# Patient Record
Sex: FEMALE | Race: WHITE | NOT HISPANIC OR LATINO | Employment: FULL TIME | URBAN - METROPOLITAN AREA
[De-identification: names, ages, dates, MRNs, and addresses within clinical notes are randomized per-mention and may not be internally consistent; named-entity substitution may affect disease eponyms.]

---

## 2017-11-20 ENCOUNTER — HOSPITAL ENCOUNTER (OUTPATIENT)
Dept: FAMILY MEDICINE CLINIC | Facility: CLINIC | Age: 37
Setting detail: SPECIMEN
Discharge: HOME | End: 2017-11-20
Attending: FAMILY MEDICINE | Admitting: FAMILY MEDICINE

## 2017-11-20 LAB
ALBUMIN SERPL-MCNC: 4 G/DL (ref 3.5–4.8)
ALBUMIN/GLOB SERPL: 1.3 {RATIO} (ref 1–1.7)
ALP SERPL-CCNC: 73 IU/L (ref 32–91)
ALT SERPL-CCNC: 18 IU/L (ref 14–54)
ANION GAP SERPL CALC-SCNC: 9.6 MMOL/L (ref 10–20)
AST SERPL-CCNC: 20 IU/L (ref 15–41)
BILIRUB SERPL-MCNC: 0.6 MG/DL (ref 0.3–1.2)
BUN SERPL-MCNC: 8 MG/DL (ref 8–20)
BUN/CREAT SERPL: 11.4 (ref 5.4–26.2)
CALCIUM SERPL-MCNC: 9.3 MG/DL (ref 8.9–10.3)
CHLORIDE SERPL-SCNC: 103 MMOL/L (ref 101–111)
CHOLEST SERPL-MCNC: 235 MG/DL
CHOLEST/HDLC SERPL: 4.1 {RATIO}
CONV CO2: 27 MMOL/L (ref 22–32)
CONV LDL CHOLESTEROL DIRECT: 158 MG/DL (ref 0–100)
CONV TOTAL PROTEIN: 7.1 G/DL (ref 6.1–7.9)
CREAT UR-MCNC: 0.7 MG/DL (ref 0.4–1)
GLOBULIN UR ELPH-MCNC: 3.1 G/DL (ref 2.5–3.8)
GLUCOSE SERPL-MCNC: 92 MG/DL (ref 65–99)
HDLC SERPL-MCNC: 58 MG/DL
LDLC/HDLC SERPL: 2.7 {RATIO}
LIPID INTERPRETATION: ABNORMAL
POTASSIUM SERPL-SCNC: 3.6 MMOL/L (ref 3.6–5.1)
SODIUM SERPL-SCNC: 136 MMOL/L (ref 136–144)
TRIGL SERPL-MCNC: 194 MG/DL
VLDLC SERPL CALC-MCNC: 19.4 MG/DL

## 2020-11-13 ENCOUNTER — OFFICE VISIT (OUTPATIENT)
Dept: FAMILY MEDICINE CLINIC | Facility: CLINIC | Age: 40
End: 2020-11-13

## 2020-11-13 DIAGNOSIS — Z01.84 IMMUNITY STATUS TESTING: ICD-10-CM

## 2020-11-13 DIAGNOSIS — I10 ESSENTIAL HYPERTENSION: Primary | ICD-10-CM

## 2020-11-13 DIAGNOSIS — Z11.59 NEED FOR HEPATITIS C SCREENING TEST: ICD-10-CM

## 2020-11-13 DIAGNOSIS — E78.2 MIXED HYPERLIPIDEMIA: ICD-10-CM

## 2020-11-13 PROBLEM — G43.909 HEADACHE, MIGRAINE: Status: ACTIVE | Noted: 2020-11-13

## 2020-11-13 PROBLEM — F41.9 ANXIETY DISORDER: Status: ACTIVE | Noted: 2020-11-13

## 2020-11-13 PROBLEM — Z00.00 ENCOUNTER FOR GENERAL ADULT MEDICAL EXAMINATION WITHOUT ABNORMAL FINDINGS: Status: ACTIVE | Noted: 2017-11-20

## 2020-11-13 PROCEDURE — 99213 OFFICE O/P EST LOW 20 MIN: CPT | Performed by: FAMILY MEDICINE

## 2020-11-13 RX ORDER — ESCITALOPRAM OXALATE 20 MG/1
20 TABLET ORAL DAILY
Qty: 90 TABLET | Refills: 3 | Status: SHIPPED | OUTPATIENT
Start: 2020-11-13 | End: 2021-12-09

## 2020-11-13 RX ORDER — ESCITALOPRAM OXALATE 20 MG/1
20 TABLET ORAL DAILY
COMMUNITY
Start: 2015-07-08 | End: 2020-11-13 | Stop reason: SDUPTHER

## 2020-11-13 RX ORDER — METOPROLOL SUCCINATE 50 MG/1
75 TABLET, EXTENDED RELEASE ORAL DAILY
Qty: 135 TABLET | Refills: 3 | Status: SHIPPED | OUTPATIENT
Start: 2020-11-13 | End: 2021-12-09

## 2020-11-13 RX ORDER — METOPROLOL SUCCINATE 50 MG/1
75 TABLET, EXTENDED RELEASE ORAL DAILY
COMMUNITY
Start: 2015-07-08 | End: 2020-11-13 | Stop reason: SDUPTHER

## 2020-11-15 VITALS
HEIGHT: 66 IN | OXYGEN SATURATION: 100 % | WEIGHT: 214 LBS | DIASTOLIC BLOOD PRESSURE: 145 MMHG | TEMPERATURE: 97.8 F | HEART RATE: 108 BPM | SYSTOLIC BLOOD PRESSURE: 187 MMHG | BODY MASS INDEX: 34.39 KG/M2

## 2020-11-23 ENCOUNTER — TELEPHONE (OUTPATIENT)
Dept: FAMILY MEDICINE CLINIC | Facility: CLINIC | Age: 40
End: 2020-11-23

## 2020-11-23 RX ORDER — LISINOPRIL 20 MG/1
20 TABLET ORAL DAILY
Qty: 90 TABLET | Refills: 3 | Status: SHIPPED | OUTPATIENT
Start: 2020-11-23 | End: 2021-12-09

## 2020-12-11 ENCOUNTER — LAB (OUTPATIENT)
Dept: FAMILY MEDICINE CLINIC | Facility: CLINIC | Age: 40
End: 2020-12-11

## 2020-12-11 ENCOUNTER — OFFICE VISIT (OUTPATIENT)
Dept: FAMILY MEDICINE CLINIC | Facility: CLINIC | Age: 40
End: 2020-12-11

## 2020-12-11 VITALS
BODY MASS INDEX: 35.36 KG/M2 | DIASTOLIC BLOOD PRESSURE: 110 MMHG | SYSTOLIC BLOOD PRESSURE: 161 MMHG | WEIGHT: 220 LBS | HEIGHT: 66 IN | OXYGEN SATURATION: 99 % | TEMPERATURE: 98.4 F | HEART RATE: 69 BPM

## 2020-12-11 DIAGNOSIS — Z11.59 NEED FOR HEPATITIS C SCREENING TEST: ICD-10-CM

## 2020-12-11 DIAGNOSIS — I10 ESSENTIAL HYPERTENSION: Primary | ICD-10-CM

## 2020-12-11 DIAGNOSIS — Z01.84 IMMUNITY STATUS TESTING: ICD-10-CM

## 2020-12-11 DIAGNOSIS — E78.2 MIXED HYPERLIPIDEMIA: ICD-10-CM

## 2020-12-11 DIAGNOSIS — I10 ESSENTIAL HYPERTENSION: ICD-10-CM

## 2020-12-11 LAB
ALBUMIN SERPL-MCNC: 4.1 G/DL (ref 3.5–5.2)
ALBUMIN/GLOB SERPL: 1.3 G/DL
ALP SERPL-CCNC: 83 U/L (ref 39–117)
ALT SERPL W P-5'-P-CCNC: 21 U/L (ref 1–33)
ANION GAP SERPL CALCULATED.3IONS-SCNC: 7 MMOL/L (ref 5–15)
AST SERPL-CCNC: 19 U/L (ref 1–32)
BILIRUB SERPL-MCNC: 0.2 MG/DL (ref 0–1.2)
BUN SERPL-MCNC: 9 MG/DL (ref 6–20)
BUN/CREAT SERPL: 11.3 (ref 7–25)
CALCIUM SPEC-SCNC: 9.4 MG/DL (ref 8.6–10.5)
CHLORIDE SERPL-SCNC: 100 MMOL/L (ref 98–107)
CHOLEST SERPL-MCNC: 235 MG/DL (ref 0–200)
CO2 SERPL-SCNC: 30 MMOL/L (ref 22–29)
CREAT SERPL-MCNC: 0.8 MG/DL (ref 0.57–1)
GFR SERPL CREATININE-BSD FRML MDRD: 80 ML/MIN/1.73
GLOBULIN UR ELPH-MCNC: 3.1 GM/DL
GLUCOSE SERPL-MCNC: 90 MG/DL (ref 65–99)
HCV AB SER DONR QL: NORMAL
HDLC SERPL-MCNC: 67 MG/DL (ref 40–60)
LDLC SERPL CALC-MCNC: 129 MG/DL (ref 0–100)
LDLC/HDLC SERPL: 1.85 {RATIO}
POTASSIUM SERPL-SCNC: 4.3 MMOL/L (ref 3.5–5.2)
PROT SERPL-MCNC: 7.2 G/DL (ref 6–8.5)
SODIUM SERPL-SCNC: 137 MMOL/L (ref 136–145)
TRIGL SERPL-MCNC: 220 MG/DL (ref 0–150)
VLDLC SERPL-MCNC: 39 MG/DL (ref 5–40)

## 2020-12-11 PROCEDURE — 86769 SARS-COV-2 COVID-19 ANTIBODY: CPT | Performed by: FAMILY MEDICINE

## 2020-12-11 PROCEDURE — 36415 COLL VENOUS BLD VENIPUNCTURE: CPT | Performed by: FAMILY MEDICINE

## 2020-12-11 PROCEDURE — 80053 COMPREHEN METABOLIC PANEL: CPT | Performed by: FAMILY MEDICINE

## 2020-12-11 PROCEDURE — 99213 OFFICE O/P EST LOW 20 MIN: CPT | Performed by: FAMILY MEDICINE

## 2020-12-11 PROCEDURE — 80061 LIPID PANEL: CPT | Performed by: FAMILY MEDICINE

## 2020-12-11 PROCEDURE — 86803 HEPATITIS C AB TEST: CPT | Performed by: FAMILY MEDICINE

## 2020-12-12 LAB — SARS-COV-2 AB SERPL QL IA: NEGATIVE

## 2021-12-09 RX ORDER — LISINOPRIL 20 MG/1
TABLET ORAL
Qty: 30 TABLET | Refills: 0 | Status: SHIPPED | OUTPATIENT
Start: 2021-12-09 | End: 2022-05-05

## 2021-12-09 RX ORDER — METOPROLOL SUCCINATE 50 MG/1
TABLET, EXTENDED RELEASE ORAL
Qty: 45 TABLET | Refills: 0 | Status: SHIPPED | OUTPATIENT
Start: 2021-12-09 | End: 2022-05-05

## 2021-12-09 RX ORDER — ESCITALOPRAM OXALATE 20 MG/1
TABLET ORAL
Qty: 30 TABLET | Refills: 0 | Status: SHIPPED | OUTPATIENT
Start: 2021-12-09 | End: 2022-05-05

## 2022-05-05 RX ORDER — ESCITALOPRAM OXALATE 20 MG/1
TABLET ORAL
Qty: 30 TABLET | Refills: 0 | Status: SHIPPED | OUTPATIENT
Start: 2022-05-05 | End: 2022-05-06

## 2022-05-05 RX ORDER — METOPROLOL SUCCINATE 50 MG/1
TABLET, EXTENDED RELEASE ORAL
Qty: 45 TABLET | Refills: 0 | Status: SHIPPED | OUTPATIENT
Start: 2022-05-05 | End: 2022-05-06

## 2022-05-05 RX ORDER — LISINOPRIL 20 MG/1
TABLET ORAL
Qty: 30 TABLET | Refills: 0 | Status: SHIPPED | OUTPATIENT
Start: 2022-05-05 | End: 2022-05-06

## 2022-05-06 RX ORDER — LISINOPRIL 20 MG/1
TABLET ORAL
Qty: 30 TABLET | Refills: 0 | Status: SHIPPED | OUTPATIENT
Start: 2022-05-06 | End: 2022-06-01

## 2022-05-06 RX ORDER — ESCITALOPRAM OXALATE 20 MG/1
TABLET ORAL
Qty: 30 TABLET | Refills: 0 | Status: SHIPPED | OUTPATIENT
Start: 2022-05-06 | End: 2022-06-01

## 2022-05-06 RX ORDER — METOPROLOL SUCCINATE 50 MG/1
TABLET, EXTENDED RELEASE ORAL
Qty: 45 TABLET | Refills: 0 | Status: SHIPPED | OUTPATIENT
Start: 2022-05-06 | End: 2022-06-01

## 2022-06-01 RX ORDER — METOPROLOL SUCCINATE 50 MG/1
TABLET, EXTENDED RELEASE ORAL
Qty: 45 TABLET | Refills: 0 | Status: SHIPPED | OUTPATIENT
Start: 2022-06-01 | End: 2023-03-21 | Stop reason: SDUPTHER

## 2022-06-01 RX ORDER — LISINOPRIL 20 MG/1
TABLET ORAL
Qty: 30 TABLET | Refills: 0 | Status: SHIPPED | OUTPATIENT
Start: 2022-06-01 | End: 2023-03-21 | Stop reason: SDUPTHER

## 2022-06-01 RX ORDER — ESCITALOPRAM OXALATE 20 MG/1
TABLET ORAL
Qty: 30 TABLET | Refills: 0 | Status: SHIPPED | OUTPATIENT
Start: 2022-06-01 | End: 2023-03-21 | Stop reason: SDUPTHER

## 2022-06-10 RX ORDER — ESCITALOPRAM OXALATE 20 MG/1
20 TABLET ORAL DAILY
Qty: 30 TABLET | Refills: 0 | OUTPATIENT
Start: 2022-06-10

## 2022-06-30 RX ORDER — ESCITALOPRAM OXALATE 20 MG/1
TABLET ORAL
Qty: 30 TABLET | Refills: 0 | OUTPATIENT
Start: 2022-06-30

## 2022-06-30 RX ORDER — LISINOPRIL 20 MG/1
TABLET ORAL
Qty: 30 TABLET | Refills: 0 | OUTPATIENT
Start: 2022-06-30

## 2022-06-30 RX ORDER — METOPROLOL SUCCINATE 50 MG/1
TABLET, EXTENDED RELEASE ORAL
Qty: 45 TABLET | Refills: 0 | OUTPATIENT
Start: 2022-06-30

## 2022-08-01 RX ORDER — ESCITALOPRAM OXALATE 20 MG/1
TABLET ORAL
Qty: 30 TABLET | Refills: 0 | OUTPATIENT
Start: 2022-08-01

## 2022-08-01 RX ORDER — LISINOPRIL 20 MG/1
TABLET ORAL
Qty: 30 TABLET | Refills: 0 | OUTPATIENT
Start: 2022-08-01

## 2022-08-01 RX ORDER — METOPROLOL SUCCINATE 50 MG/1
TABLET, EXTENDED RELEASE ORAL
Qty: 45 TABLET | Refills: 0 | OUTPATIENT
Start: 2022-08-01

## 2023-03-21 ENCOUNTER — LAB (OUTPATIENT)
Dept: FAMILY MEDICINE CLINIC | Facility: CLINIC | Age: 43
End: 2023-03-21
Payer: COMMERCIAL

## 2023-03-21 ENCOUNTER — OFFICE VISIT (OUTPATIENT)
Dept: FAMILY MEDICINE CLINIC | Facility: CLINIC | Age: 43
End: 2023-03-21
Payer: COMMERCIAL

## 2023-03-21 VITALS
BODY MASS INDEX: 31.55 KG/M2 | DIASTOLIC BLOOD PRESSURE: 107 MMHG | HEIGHT: 67 IN | OXYGEN SATURATION: 99 % | SYSTOLIC BLOOD PRESSURE: 180 MMHG | WEIGHT: 201 LBS | HEART RATE: 93 BPM | TEMPERATURE: 97.7 F

## 2023-03-21 DIAGNOSIS — I10 PRIMARY HYPERTENSION: ICD-10-CM

## 2023-03-21 DIAGNOSIS — E78.2 MIXED HYPERLIPIDEMIA: Primary | ICD-10-CM

## 2023-03-21 DIAGNOSIS — E78.2 MIXED HYPERLIPIDEMIA: ICD-10-CM

## 2023-03-21 DIAGNOSIS — M25.531 RIGHT WRIST PAIN: ICD-10-CM

## 2023-03-21 LAB
ALBUMIN SERPL-MCNC: 4.3 G/DL (ref 3.5–5.2)
ALBUMIN/GLOB SERPL: 1.4 G/DL
ALP SERPL-CCNC: 107 U/L (ref 39–117)
ALT SERPL W P-5'-P-CCNC: 16 U/L (ref 1–33)
ANION GAP SERPL CALCULATED.3IONS-SCNC: 13.3 MMOL/L (ref 5–15)
AST SERPL-CCNC: 15 U/L (ref 1–32)
BILIRUB SERPL-MCNC: 0.2 MG/DL (ref 0–1.2)
BUN SERPL-MCNC: 12 MG/DL (ref 6–20)
BUN/CREAT SERPL: 16.7 (ref 7–25)
CALCIUM SPEC-SCNC: 9.6 MG/DL (ref 8.6–10.5)
CHLORIDE SERPL-SCNC: 102 MMOL/L (ref 98–107)
CHOLEST SERPL-MCNC: 262 MG/DL (ref 0–200)
CO2 SERPL-SCNC: 24.7 MMOL/L (ref 22–29)
CREAT SERPL-MCNC: 0.72 MG/DL (ref 0.57–1)
EGFRCR SERPLBLD CKD-EPI 2021: 107.9 ML/MIN/1.73
GLOBULIN UR ELPH-MCNC: 3 GM/DL
GLUCOSE SERPL-MCNC: 93 MG/DL (ref 65–99)
HDLC SERPL-MCNC: 60 MG/DL (ref 40–60)
LDLC SERPL CALC-MCNC: 145 MG/DL (ref 0–100)
LDLC/HDLC SERPL: 2.32 {RATIO}
POTASSIUM SERPL-SCNC: 4 MMOL/L (ref 3.5–5.2)
PROT SERPL-MCNC: 7.3 G/DL (ref 6–8.5)
SODIUM SERPL-SCNC: 140 MMOL/L (ref 136–145)
TRIGL SERPL-MCNC: 314 MG/DL (ref 0–150)
VLDLC SERPL-MCNC: 57 MG/DL (ref 5–40)

## 2023-03-21 PROCEDURE — 80061 LIPID PANEL: CPT | Performed by: FAMILY MEDICINE

## 2023-03-21 PROCEDURE — 36415 COLL VENOUS BLD VENIPUNCTURE: CPT

## 2023-03-21 PROCEDURE — 80053 COMPREHEN METABOLIC PANEL: CPT | Performed by: FAMILY MEDICINE

## 2023-03-21 PROCEDURE — 99213 OFFICE O/P EST LOW 20 MIN: CPT | Performed by: FAMILY MEDICINE

## 2023-03-21 RX ORDER — ESCITALOPRAM OXALATE 20 MG/1
20 TABLET ORAL DAILY
Qty: 90 TABLET | Refills: 3 | Status: SHIPPED | OUTPATIENT
Start: 2023-03-21

## 2023-03-21 RX ORDER — LISINOPRIL 20 MG/1
20 TABLET ORAL DAILY
Qty: 90 TABLET | Refills: 3 | Status: SHIPPED | OUTPATIENT
Start: 2023-03-21

## 2023-03-21 RX ORDER — CETIRIZINE HYDROCHLORIDE 10 MG/1
10 TABLET ORAL DAILY
COMMUNITY

## 2023-03-21 RX ORDER — METOPROLOL SUCCINATE 50 MG/1
75 TABLET, EXTENDED RELEASE ORAL DAILY
Qty: 135 TABLET | Refills: 3 | Status: SHIPPED | OUTPATIENT
Start: 2023-03-21

## 2024-06-15 ENCOUNTER — APPOINTMENT (OUTPATIENT)
Dept: GENERAL RADIOLOGY | Facility: HOSPITAL | Age: 44
End: 2024-06-15
Payer: COMMERCIAL

## 2024-06-15 ENCOUNTER — HOSPITAL ENCOUNTER (EMERGENCY)
Facility: HOSPITAL | Age: 44
Discharge: HOME | End: 2024-06-15
Payer: COMMERCIAL

## 2024-06-15 VITALS
DIASTOLIC BLOOD PRESSURE: 116 MMHG | OXYGEN SATURATION: 94 % | BODY MASS INDEX: 37.52 KG/M2 | HEART RATE: 77 BPM | RESPIRATION RATE: 18 BRPM | WEIGHT: 233.47 LBS | HEIGHT: 66 IN | SYSTOLIC BLOOD PRESSURE: 167 MMHG | TEMPERATURE: 98.2 F

## 2024-06-15 DIAGNOSIS — M54.9 MUSCULOSKELETAL BACK PAIN: Primary | ICD-10-CM

## 2024-06-15 PROCEDURE — 96372 THER/PROPH/DIAG INJ SC/IM: CPT

## 2024-06-15 PROCEDURE — 25010000002 DEXAMETHASONE SODIUM PHOSPHATE 10 MG/ML SOLUTION: Performed by: NURSE PRACTITIONER

## 2024-06-15 PROCEDURE — 99283 EMERGENCY DEPT VISIT LOW MDM: CPT

## 2024-06-15 PROCEDURE — 25010000002 KETOROLAC TROMETHAMINE PER 15 MG: Performed by: NURSE PRACTITIONER

## 2024-06-15 PROCEDURE — 63710000001 ONDANSETRON ODT 4 MG TABLET DISPERSIBLE: Performed by: NURSE PRACTITIONER

## 2024-06-15 PROCEDURE — 73010 X-RAY EXAM OF SHOULDER BLADE: CPT

## 2024-06-15 RX ORDER — KETOROLAC TROMETHAMINE 30 MG/ML
60 INJECTION, SOLUTION INTRAMUSCULAR; INTRAVENOUS ONCE
Status: COMPLETED | OUTPATIENT
Start: 2024-06-15 | End: 2024-06-15

## 2024-06-15 RX ORDER — METHYLPREDNISOLONE 4 MG/1
TABLET ORAL
Qty: 21 TABLET | Refills: 0 | Status: SHIPPED | OUTPATIENT
Start: 2024-06-15

## 2024-06-15 RX ORDER — DEXAMETHASONE SODIUM PHOSPHATE 10 MG/ML
10 INJECTION, SOLUTION INTRAMUSCULAR; INTRAVENOUS ONCE
Status: COMPLETED | OUTPATIENT
Start: 2024-06-15 | End: 2024-06-15

## 2024-06-15 RX ORDER — ACETAMINOPHEN 500 MG
1000 TABLET ORAL ONCE
Status: COMPLETED | OUTPATIENT
Start: 2024-06-15 | End: 2024-06-15

## 2024-06-15 RX ORDER — LIDOCAINE 50 MG/G
1 PATCH TOPICAL EVERY 24 HOURS
Qty: 5 PATCH | Refills: 0 | Status: SHIPPED | OUTPATIENT
Start: 2024-06-15 | End: 2024-06-20

## 2024-06-15 RX ORDER — METOPROLOL SUCCINATE 50 MG/1
100 TABLET, EXTENDED RELEASE ORAL ONCE
Status: COMPLETED | OUTPATIENT
Start: 2024-06-15 | End: 2024-06-15

## 2024-06-15 RX ORDER — CYCLOBENZAPRINE HCL 10 MG
10 TABLET ORAL ONCE
Status: COMPLETED | OUTPATIENT
Start: 2024-06-15 | End: 2024-06-15

## 2024-06-15 RX ORDER — LIDOCAINE 4 G/G
1 PATCH TOPICAL
Status: DISCONTINUED | OUTPATIENT
Start: 2024-06-15 | End: 2024-06-15 | Stop reason: HOSPADM

## 2024-06-15 RX ORDER — ONDANSETRON 4 MG/1
4 TABLET, ORALLY DISINTEGRATING ORAL ONCE
Status: COMPLETED | OUTPATIENT
Start: 2024-06-15 | End: 2024-06-15

## 2024-06-15 RX ORDER — CYCLOBENZAPRINE HCL 10 MG
10 TABLET ORAL 3 TIMES DAILY PRN
Qty: 9 TABLET | Refills: 0 | Status: SHIPPED | OUTPATIENT
Start: 2024-06-15 | End: 2024-06-18

## 2024-06-15 RX ADMIN — KETOROLAC TROMETHAMINE 60 MG: 30 INJECTION, SOLUTION INTRAMUSCULAR at 15:34

## 2024-06-15 RX ADMIN — DEXAMETHASONE SODIUM PHOSPHATE 10 MG: 10 INJECTION, SOLUTION INTRAMUSCULAR; INTRAVENOUS at 15:35

## 2024-06-15 RX ADMIN — METOPROLOL SUCCINATE 100 MG: 50 TABLET, EXTENDED RELEASE ORAL at 15:33

## 2024-06-15 RX ADMIN — LISINOPRIL 30 MG: 20 TABLET ORAL at 15:33

## 2024-06-15 RX ADMIN — CYCLOBENZAPRINE 10 MG: 10 TABLET, FILM COATED ORAL at 15:33

## 2024-06-15 RX ADMIN — ACETAMINOPHEN 1000 MG: 500 TABLET, FILM COATED ORAL at 14:18

## 2024-06-15 RX ADMIN — ONDANSETRON 4 MG: 4 TABLET, ORALLY DISINTEGRATING ORAL at 15:33

## 2024-06-15 RX ADMIN — LIDOCAINE 1 PATCH: 4 PATCH TOPICAL at 14:18

## 2024-07-02 ENCOUNTER — TRANSCRIBE ORDERS (OUTPATIENT)
Dept: PHYSICAL THERAPY | Facility: CLINIC | Age: 44
End: 2024-07-02
Payer: COMMERCIAL

## 2024-07-02 DIAGNOSIS — M54.12 RADICULOPATHY, CERVICAL REGION: ICD-10-CM

## 2024-07-02 DIAGNOSIS — S46.812D STRAIN OF OTHER MUSCLES, FASCIA AND TENDONS AT SHOULDER AND UPPER ARM LEVEL, LEFT ARM, SUBSEQUENT ENCOUNTER: Primary | ICD-10-CM
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
## 2024-07-02 DIAGNOSIS — S23.3XXD SPRAIN OF LIGAMENTS OF THORACIC SPINE, SUBSEQUENT ENCOUNTER: ICD-10-CM
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
## 2024-07-03 ENCOUNTER — TREATMENT (OUTPATIENT)
Dept: PHYSICAL THERAPY | Facility: CLINIC | Age: 44
End: 2024-07-03
Payer: COMMERCIAL
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
## 2024-07-03 DIAGNOSIS — S23.3XXD SPRAIN OF LIGAMENTS OF THORACIC SPINE, SUBSEQUENT ENCOUNTER: ICD-10-CM

## 2024-07-03 DIAGNOSIS — S46.812D STRAIN OF OTHER MUSCLES, FASCIA AND TENDONS AT SHOULDER AND UPPER ARM LEVEL, LEFT ARM, SUBSEQUENT ENCOUNTER: Primary | ICD-10-CM
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
## 2024-07-03 DIAGNOSIS — M54.12 RADICULOPATHY, CERVICAL REGION: ICD-10-CM

## 2024-07-05 ENCOUNTER — TREATMENT (OUTPATIENT)
Dept: PHYSICAL THERAPY | Facility: CLINIC | Age: 44
End: 2024-07-05
Payer: COMMERCIAL
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
## 2024-07-05 DIAGNOSIS — M54.12 RADICULOPATHY, CERVICAL REGION: ICD-10-CM

## 2024-07-05 DIAGNOSIS — S46.812D STRAIN OF OTHER MUSCLES, FASCIA AND TENDONS AT SHOULDER AND UPPER ARM LEVEL, LEFT ARM, SUBSEQUENT ENCOUNTER: Primary | ICD-10-CM
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
## 2024-07-05 DIAGNOSIS — S23.3XXD SPRAIN OF LIGAMENTS OF THORACIC SPINE, SUBSEQUENT ENCOUNTER: ICD-10-CM

## 2024-07-08 ENCOUNTER — TREATMENT (OUTPATIENT)
Dept: PHYSICAL THERAPY | Facility: CLINIC | Age: 44
End: 2024-07-08
Payer: COMMERCIAL
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
## 2024-07-08 DIAGNOSIS — M54.12 RADICULOPATHY, CERVICAL REGION: ICD-10-CM
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
## 2024-07-08 DIAGNOSIS — S46.812D STRAIN OF OTHER MUSCLES, FASCIA AND TENDONS AT SHOULDER AND UPPER ARM LEVEL, LEFT ARM, SUBSEQUENT ENCOUNTER: Primary | ICD-10-CM

## 2024-07-08 DIAGNOSIS — S23.3XXD SPRAIN OF LIGAMENTS OF THORACIC SPINE, SUBSEQUENT ENCOUNTER: ICD-10-CM

## 2024-07-12 ENCOUNTER — TREATMENT (OUTPATIENT)
Dept: PHYSICAL THERAPY | Facility: CLINIC | Age: 44
End: 2024-07-12
Payer: COMMERCIAL

## 2024-07-12 DIAGNOSIS — M54.12 RADICULOPATHY, CERVICAL REGION: ICD-10-CM
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
## 2024-07-12 DIAGNOSIS — S46.812D STRAIN OF OTHER MUSCLES, FASCIA AND TENDONS AT SHOULDER AND UPPER ARM LEVEL, LEFT ARM, SUBSEQUENT ENCOUNTER: Primary | ICD-10-CM

## 2024-07-12 DIAGNOSIS — S23.3XXD SPRAIN OF LIGAMENTS OF THORACIC SPINE, SUBSEQUENT ENCOUNTER: ICD-10-CM

## 2024-07-18 ENCOUNTER — TREATMENT (OUTPATIENT)
Dept: PHYSICAL THERAPY | Facility: CLINIC | Age: 44
End: 2024-07-18
Payer: COMMERCIAL

## 2024-07-18 DIAGNOSIS — M54.12 RADICULOPATHY, CERVICAL REGION: ICD-10-CM

## 2024-07-18 DIAGNOSIS — S46.812D STRAIN OF OTHER MUSCLES, FASCIA AND TENDONS AT SHOULDER AND UPPER ARM LEVEL, LEFT ARM, SUBSEQUENT ENCOUNTER: Primary | ICD-10-CM
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
## 2024-07-18 DIAGNOSIS — S23.3XXD SPRAIN OF LIGAMENTS OF THORACIC SPINE, SUBSEQUENT ENCOUNTER: ICD-10-CM
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
## 2024-07-19 ENCOUNTER — TREATMENT (OUTPATIENT)
Dept: PHYSICAL THERAPY | Facility: CLINIC | Age: 44
End: 2024-07-19
Payer: COMMERCIAL
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
## 2024-07-19 DIAGNOSIS — S23.3XXD SPRAIN OF LIGAMENTS OF THORACIC SPINE, SUBSEQUENT ENCOUNTER: ICD-10-CM

## 2024-07-19 DIAGNOSIS — S46.812D STRAIN OF OTHER MUSCLES, FASCIA AND TENDONS AT SHOULDER AND UPPER ARM LEVEL, LEFT ARM, SUBSEQUENT ENCOUNTER: Primary | ICD-10-CM

## 2024-07-19 DIAGNOSIS — M54.12 RADICULOPATHY, CERVICAL REGION: ICD-10-CM

## 2024-07-22 ENCOUNTER — TREATMENT (OUTPATIENT)
Dept: PHYSICAL THERAPY | Facility: CLINIC | Age: 44
End: 2024-07-22
Payer: COMMERCIAL
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
## 2024-07-22 DIAGNOSIS — M54.12 RADICULOPATHY, CERVICAL REGION: ICD-10-CM

## 2024-07-22 DIAGNOSIS — S46.812D STRAIN OF OTHER MUSCLES, FASCIA AND TENDONS AT SHOULDER AND UPPER ARM LEVEL, LEFT ARM, SUBSEQUENT ENCOUNTER: Primary | ICD-10-CM

## 2024-07-22 DIAGNOSIS — S23.3XXD SPRAIN OF LIGAMENTS OF THORACIC SPINE, SUBSEQUENT ENCOUNTER: ICD-10-CM

## 2024-07-24 ENCOUNTER — TRANSCRIBE ORDERS (OUTPATIENT)
Dept: ADMINISTRATIVE | Facility: HOSPITAL | Age: 44
End: 2024-07-24
Payer: COMMERCIAL

## 2024-07-24 DIAGNOSIS — S46.812A STRAIN OF OTHER MUSCLES, FASCIA AND TENDONS AT SHOULDER AND UPPER ARM LEVEL, LEFT ARM, INITIAL ENCOUNTER: Primary | ICD-10-CM

## 2024-07-29 ENCOUNTER — TREATMENT (OUTPATIENT)
Dept: PHYSICAL THERAPY | Facility: CLINIC | Age: 44
End: 2024-07-29
Payer: COMMERCIAL
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
## 2024-07-29 DIAGNOSIS — M54.12 RADICULOPATHY, CERVICAL REGION: ICD-10-CM

## 2024-07-29 DIAGNOSIS — S23.3XXD SPRAIN OF LIGAMENTS OF THORACIC SPINE, SUBSEQUENT ENCOUNTER: ICD-10-CM

## 2024-07-29 DIAGNOSIS — S46.812D STRAIN OF OTHER MUSCLES, FASCIA AND TENDONS AT SHOULDER AND UPPER ARM LEVEL, LEFT ARM, SUBSEQUENT ENCOUNTER: Primary | ICD-10-CM

## 2024-07-29 PROCEDURE — 97140 MANUAL THERAPY 1/> REGIONS: CPT | Performed by: PHYSICAL THERAPIST

## 2024-07-29 PROCEDURE — 97110 THERAPEUTIC EXERCISES: CPT | Performed by: PHYSICAL THERAPIST

## 2024-07-29 PROCEDURE — 97012 MECHANICAL TRACTION THERAPY: CPT | Performed by: PHYSICAL THERAPIST

## 2024-07-31 ENCOUNTER — TREATMENT (OUTPATIENT)
Dept: PHYSICAL THERAPY | Facility: CLINIC | Age: 44
End: 2024-07-31
Payer: COMMERCIAL
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
## 2024-07-31 DIAGNOSIS — M54.12 RADICULOPATHY, CERVICAL REGION: ICD-10-CM

## 2024-07-31 DIAGNOSIS — S23.3XXD SPRAIN OF LIGAMENTS OF THORACIC SPINE, SUBSEQUENT ENCOUNTER: ICD-10-CM

## 2024-07-31 DIAGNOSIS — S46.812D STRAIN OF OTHER MUSCLES, FASCIA AND TENDONS AT SHOULDER AND UPPER ARM LEVEL, LEFT ARM, SUBSEQUENT ENCOUNTER: Primary | ICD-10-CM
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
## 2024-08-05 ENCOUNTER — TREATMENT (OUTPATIENT)
Dept: PHYSICAL THERAPY | Facility: CLINIC | Age: 44
End: 2024-08-05
Payer: COMMERCIAL

## 2024-08-05 DIAGNOSIS — S23.3XXD SPRAIN OF LIGAMENTS OF THORACIC SPINE, SUBSEQUENT ENCOUNTER: ICD-10-CM
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
## 2024-08-05 DIAGNOSIS — S46.812D STRAIN OF OTHER MUSCLES, FASCIA AND TENDONS AT SHOULDER AND UPPER ARM LEVEL, LEFT ARM, SUBSEQUENT ENCOUNTER: Primary | ICD-10-CM

## 2024-08-05 DIAGNOSIS — M54.12 RADICULOPATHY, CERVICAL REGION: ICD-10-CM
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
## 2024-08-05 PROCEDURE — 97140 MANUAL THERAPY 1/> REGIONS: CPT | Performed by: PHYSICAL THERAPIST

## 2024-08-05 PROCEDURE — 97110 THERAPEUTIC EXERCISES: CPT | Performed by: PHYSICAL THERAPIST

## 2024-08-05 PROCEDURE — 97530 THERAPEUTIC ACTIVITIES: CPT | Performed by: PHYSICAL THERAPIST

## 2024-08-05 PROCEDURE — 97012 MECHANICAL TRACTION THERAPY: CPT | Performed by: PHYSICAL THERAPIST

## 2024-08-09 ENCOUNTER — TREATMENT (OUTPATIENT)
Dept: PHYSICAL THERAPY | Facility: CLINIC | Age: 44
End: 2024-08-09
Payer: COMMERCIAL
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
## 2024-08-09 DIAGNOSIS — S23.3XXD SPRAIN OF LIGAMENTS OF THORACIC SPINE, SUBSEQUENT ENCOUNTER: ICD-10-CM
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
## 2024-08-09 DIAGNOSIS — M54.12 RADICULOPATHY, CERVICAL REGION: ICD-10-CM

## 2024-08-09 DIAGNOSIS — S46.812D STRAIN OF OTHER MUSCLES, FASCIA AND TENDONS AT SHOULDER AND UPPER ARM LEVEL, LEFT ARM, SUBSEQUENT ENCOUNTER: Primary | ICD-10-CM

## 2024-08-14 ENCOUNTER — TREATMENT (OUTPATIENT)
Dept: PHYSICAL THERAPY | Facility: CLINIC | Age: 44
End: 2024-08-14
Payer: COMMERCIAL
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
## 2024-08-14 DIAGNOSIS — S46.812D STRAIN OF OTHER MUSCLES, FASCIA AND TENDONS AT SHOULDER AND UPPER ARM LEVEL, LEFT ARM, SUBSEQUENT ENCOUNTER: Primary | ICD-10-CM

## 2024-08-14 DIAGNOSIS — M54.12 RADICULOPATHY, CERVICAL REGION: ICD-10-CM

## 2024-08-14 DIAGNOSIS — S23.3XXD SPRAIN OF LIGAMENTS OF THORACIC SPINE, SUBSEQUENT ENCOUNTER: ICD-10-CM

## 2024-08-15 ENCOUNTER — TRANSCRIBE ORDERS (OUTPATIENT)
Dept: PHYSICAL THERAPY | Facility: CLINIC | Age: 44
End: 2024-08-15
Payer: COMMERCIAL

## 2024-08-15 DIAGNOSIS — S16.1XXD STRAIN OF NECK MUSCLE, SUBSEQUENT ENCOUNTER: Primary | ICD-10-CM

## 2024-08-16 ENCOUNTER — TREATMENT (OUTPATIENT)
Dept: PHYSICAL THERAPY | Facility: CLINIC | Age: 44
End: 2024-08-16
Payer: COMMERCIAL
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
## 2024-08-16 DIAGNOSIS — S23.3XXD SPRAIN OF LIGAMENTS OF THORACIC SPINE, SUBSEQUENT ENCOUNTER: ICD-10-CM

## 2024-08-16 DIAGNOSIS — M54.12 RADICULOPATHY, CERVICAL REGION: ICD-10-CM

## 2024-08-16 DIAGNOSIS — S46.812D STRAIN OF OTHER MUSCLES, FASCIA AND TENDONS AT SHOULDER AND UPPER ARM LEVEL, LEFT ARM, SUBSEQUENT ENCOUNTER: Primary | ICD-10-CM

## 2024-08-28 ENCOUNTER — OFFICE VISIT (OUTPATIENT)
Dept: NEUROSURGERY | Facility: CLINIC | Age: 44
End: 2024-08-28
Payer: COMMERCIAL

## 2024-08-28 VITALS
OXYGEN SATURATION: 99 % | HEART RATE: 68 BPM | WEIGHT: 230 LBS | HEIGHT: 66 IN | SYSTOLIC BLOOD PRESSURE: 170 MMHG | BODY MASS INDEX: 36.96 KG/M2 | DIASTOLIC BLOOD PRESSURE: 118 MMHG

## 2024-08-28 DIAGNOSIS — M50.30 DDD (DEGENERATIVE DISC DISEASE), CERVICAL: Primary | ICD-10-CM

## 2024-08-28 DIAGNOSIS — M50.20 CERVICAL DISC HERNIATION: ICD-10-CM

## 2024-08-28 PROCEDURE — 99213 OFFICE O/P EST LOW 20 MIN: CPT | Performed by: NURSE PRACTITIONER

## 2024-08-28 RX ORDER — LEVOCETIRIZINE DIHYDROCHLORIDE 5 MG/1
TABLET, FILM COATED ORAL
COMMUNITY
Start: 2024-01-30

## 2024-08-29 ENCOUNTER — PATIENT ROUNDING (BHMG ONLY) (OUTPATIENT)
Dept: NEUROSURGERY | Facility: CLINIC | Age: 44
End: 2024-08-29
Payer: COMMERCIAL

## 2024-09-11 ENCOUNTER — TREATMENT (OUTPATIENT)
Dept: PHYSICAL THERAPY | Facility: CLINIC | Age: 44
End: 2024-09-11
Payer: COMMERCIAL

## 2024-09-11 ENCOUNTER — TELEPHONE (OUTPATIENT)
Dept: NEUROSURGERY | Facility: CLINIC | Age: 44
End: 2024-09-11

## 2024-09-11 DIAGNOSIS — S46.812D STRAIN OF OTHER MUSCLES, FASCIA AND TENDONS AT SHOULDER AND UPPER ARM LEVEL, LEFT ARM, SUBSEQUENT ENCOUNTER: Primary | ICD-10-CM

## 2024-09-11 DIAGNOSIS — M54.12 RADICULOPATHY, CERVICAL REGION: ICD-10-CM
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
## 2024-09-11 DIAGNOSIS — S23.3XXD SPRAIN OF LIGAMENTS OF THORACIC SPINE, SUBSEQUENT ENCOUNTER: ICD-10-CM
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
## 2024-09-13 ENCOUNTER — TREATMENT (OUTPATIENT)
Dept: PHYSICAL THERAPY | Facility: CLINIC | Age: 44
End: 2024-09-13
Payer: COMMERCIAL

## 2024-09-13 DIAGNOSIS — M54.12 RADICULOPATHY, CERVICAL REGION: ICD-10-CM
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
## 2024-09-13 DIAGNOSIS — S46.812D STRAIN OF OTHER MUSCLES, FASCIA AND TENDONS AT SHOULDER AND UPPER ARM LEVEL, LEFT ARM, SUBSEQUENT ENCOUNTER: Primary | ICD-10-CM

## 2024-09-13 DIAGNOSIS — S23.3XXD SPRAIN OF LIGAMENTS OF THORACIC SPINE, SUBSEQUENT ENCOUNTER: ICD-10-CM

## 2024-09-16 ENCOUNTER — TREATMENT (OUTPATIENT)
Dept: PHYSICAL THERAPY | Facility: CLINIC | Age: 44
End: 2024-09-16
Payer: COMMERCIAL
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
## 2024-09-16 DIAGNOSIS — S46.812D STRAIN OF OTHER MUSCLES, FASCIA AND TENDONS AT SHOULDER AND UPPER ARM LEVEL, LEFT ARM, SUBSEQUENT ENCOUNTER: Primary | ICD-10-CM
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
## 2024-09-16 DIAGNOSIS — S23.3XXD SPRAIN OF LIGAMENTS OF THORACIC SPINE, SUBSEQUENT ENCOUNTER: ICD-10-CM

## 2024-09-16 DIAGNOSIS — M54.12 RADICULOPATHY, CERVICAL REGION: ICD-10-CM

## 2024-09-16 PROCEDURE — 97110 THERAPEUTIC EXERCISES: CPT | Performed by: PHYSICAL THERAPIST

## 2024-09-16 PROCEDURE — 97112 NEUROMUSCULAR REEDUCATION: CPT | Performed by: PHYSICAL THERAPIST

## 2024-09-16 PROCEDURE — 97530 THERAPEUTIC ACTIVITIES: CPT | Performed by: PHYSICAL THERAPIST

## 2024-09-16 PROCEDURE — 97012 MECHANICAL TRACTION THERAPY: CPT | Performed by: PHYSICAL THERAPIST

## 2024-09-17 RX ORDER — LISINOPRIL 20 MG/1
30 TABLET ORAL DAILY
Qty: 45 TABLET | Refills: 0 | Status: SHIPPED | OUTPATIENT
Start: 2024-09-17

## 2024-09-17 RX ORDER — ESCITALOPRAM OXALATE 20 MG/1
20 TABLET ORAL DAILY
Qty: 30 TABLET | Refills: 0 | Status: SHIPPED | OUTPATIENT
Start: 2024-09-17

## 2024-09-17 RX ORDER — METOPROLOL SUCCINATE 50 MG/1
100 TABLET, EXTENDED RELEASE ORAL DAILY
Qty: 60 TABLET | Refills: 0 | Status: SHIPPED | OUTPATIENT
Start: 2024-09-17

## 2024-09-20 ENCOUNTER — TREATMENT (OUTPATIENT)
Dept: PHYSICAL THERAPY | Facility: CLINIC | Age: 44
End: 2024-09-20
Payer: COMMERCIAL

## 2024-09-20 DIAGNOSIS — S23.3XXD SPRAIN OF LIGAMENTS OF THORACIC SPINE, SUBSEQUENT ENCOUNTER: ICD-10-CM

## 2024-09-20 DIAGNOSIS — S46.812D STRAIN OF OTHER MUSCLES, FASCIA AND TENDONS AT SHOULDER AND UPPER ARM LEVEL, LEFT ARM, SUBSEQUENT ENCOUNTER: Primary | ICD-10-CM
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
## 2024-09-20 DIAGNOSIS — M54.12 RADICULOPATHY, CERVICAL REGION: ICD-10-CM

## 2024-10-04 ENCOUNTER — TREATMENT (OUTPATIENT)
Dept: PHYSICAL THERAPY | Facility: CLINIC | Age: 44
End: 2024-10-04
Payer: COMMERCIAL

## 2024-10-04 ENCOUNTER — DOCUMENTATION (OUTPATIENT)
Dept: PHYSICAL THERAPY | Facility: CLINIC | Age: 44
End: 2024-10-04
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
## 2024-10-04 DIAGNOSIS — S46.812D STRAIN OF OTHER MUSCLES, FASCIA AND TENDONS AT SHOULDER AND UPPER ARM LEVEL, LEFT ARM, SUBSEQUENT ENCOUNTER: Primary | ICD-10-CM

## 2024-10-04 DIAGNOSIS — M54.12 RADICULOPATHY, CERVICAL REGION: ICD-10-CM

## 2024-10-04 DIAGNOSIS — S23.3XXD SPRAIN OF LIGAMENTS OF THORACIC SPINE, SUBSEQUENT ENCOUNTER: ICD-10-CM
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
## 2024-10-10 ENCOUNTER — OFFICE VISIT (OUTPATIENT)
Dept: FAMILY MEDICINE CLINIC | Facility: CLINIC | Age: 44
End: 2024-10-10
Payer: COMMERCIAL

## 2024-10-10 ENCOUNTER — LAB (OUTPATIENT)
Dept: FAMILY MEDICINE CLINIC | Facility: CLINIC | Age: 44
End: 2024-10-10
Payer: COMMERCIAL

## 2024-10-10 VITALS
SYSTOLIC BLOOD PRESSURE: 140 MMHG | BODY MASS INDEX: 36.48 KG/M2 | DIASTOLIC BLOOD PRESSURE: 100 MMHG | HEART RATE: 65 BPM | HEIGHT: 66 IN | OXYGEN SATURATION: 98 % | WEIGHT: 227 LBS

## 2024-10-10 DIAGNOSIS — Z12.31 ENCOUNTER FOR SCREENING MAMMOGRAM FOR MALIGNANT NEOPLASM OF BREAST: ICD-10-CM

## 2024-10-10 DIAGNOSIS — E66.9 OBESITY (BMI 30-39.9): ICD-10-CM

## 2024-10-10 DIAGNOSIS — Z00.01 ENCOUNTER FOR GENERAL ADULT MEDICAL EXAMINATION WITH ABNORMAL FINDINGS: Primary | ICD-10-CM

## 2024-10-10 DIAGNOSIS — Z00.01 ENCOUNTER FOR GENERAL ADULT MEDICAL EXAMINATION WITH ABNORMAL FINDINGS: ICD-10-CM

## 2024-10-10 DIAGNOSIS — I10 PRIMARY HYPERTENSION: ICD-10-CM

## 2024-10-10 LAB
ALBUMIN SERPL-MCNC: 4 G/DL (ref 3.5–5.2)
ALBUMIN/GLOB SERPL: 1.5 G/DL
ALP SERPL-CCNC: 91 U/L (ref 39–117)
ALT SERPL W P-5'-P-CCNC: 43 U/L (ref 1–33)
ANION GAP SERPL CALCULATED.3IONS-SCNC: 8 MMOL/L (ref 5–15)
AST SERPL-CCNC: 24 U/L (ref 1–32)
BILIRUB SERPL-MCNC: 0.3 MG/DL (ref 0–1.2)
BUN SERPL-MCNC: 11 MG/DL (ref 6–20)
BUN/CREAT SERPL: 13.8 (ref 7–25)
CALCIUM SPEC-SCNC: 9.5 MG/DL (ref 8.6–10.5)
CHLORIDE SERPL-SCNC: 105 MMOL/L (ref 98–107)
CHOLEST SERPL-MCNC: 218 MG/DL (ref 0–200)
CO2 SERPL-SCNC: 27 MMOL/L (ref 22–29)
CREAT SERPL-MCNC: 0.8 MG/DL (ref 0.57–1)
EGFRCR SERPLBLD CKD-EPI 2021: 93.9 ML/MIN/1.73
GLOBULIN UR ELPH-MCNC: 2.7 GM/DL
GLUCOSE SERPL-MCNC: 91 MG/DL (ref 65–99)
HBA1C MFR BLD: 5.2 % (ref 4.8–5.6)
HDLC SERPL-MCNC: 59 MG/DL (ref 40–60)
LDLC SERPL CALC-MCNC: 135 MG/DL (ref 0–100)
LDLC/HDLC SERPL: 2.24 {RATIO}
POTASSIUM SERPL-SCNC: 4.1 MMOL/L (ref 3.5–5.2)
PROT SERPL-MCNC: 6.7 G/DL (ref 6–8.5)
SODIUM SERPL-SCNC: 140 MMOL/L (ref 136–145)
TRIGL SERPL-MCNC: 134 MG/DL (ref 0–150)
VLDLC SERPL-MCNC: 24 MG/DL (ref 5–40)

## 2024-10-10 PROCEDURE — 36415 COLL VENOUS BLD VENIPUNCTURE: CPT

## 2024-10-10 PROCEDURE — 99396 PREV VISIT EST AGE 40-64: CPT | Performed by: FAMILY MEDICINE

## 2024-10-10 PROCEDURE — 80061 LIPID PANEL: CPT | Performed by: FAMILY MEDICINE

## 2024-10-10 PROCEDURE — 83036 HEMOGLOBIN GLYCOSYLATED A1C: CPT | Performed by: FAMILY MEDICINE

## 2024-10-10 PROCEDURE — 80053 COMPREHEN METABOLIC PANEL: CPT | Performed by: FAMILY MEDICINE

## 2024-10-10 RX ORDER — LOSARTAN POTASSIUM 50 MG/1
50 TABLET ORAL DAILY
Qty: 30 TABLET | Refills: 1 | Status: SHIPPED | OUTPATIENT
Start: 2024-10-10

## 2024-10-11 ENCOUNTER — TELEPHONE (OUTPATIENT)
Dept: NEUROSURGERY | Facility: CLINIC | Age: 44
End: 2024-10-11

## 2024-10-11 ENCOUNTER — TREATMENT (OUTPATIENT)
Dept: PHYSICAL THERAPY | Facility: CLINIC | Age: 44
End: 2024-10-11
Payer: COMMERCIAL

## 2024-10-11 DIAGNOSIS — S46.812D STRAIN OF OTHER MUSCLES, FASCIA AND TENDONS AT SHOULDER AND UPPER ARM LEVEL, LEFT ARM, SUBSEQUENT ENCOUNTER: Primary | ICD-10-CM

## 2024-10-11 DIAGNOSIS — S23.3XXD SPRAIN OF LIGAMENTS OF THORACIC SPINE, SUBSEQUENT ENCOUNTER: ICD-10-CM
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
## 2024-10-11 DIAGNOSIS — M54.12 RADICULOPATHY, CERVICAL REGION: ICD-10-CM

## 2024-10-11 DIAGNOSIS — M50.30 DDD (DEGENERATIVE DISC DISEASE), CERVICAL: Primary | ICD-10-CM

## 2024-10-15 ENCOUNTER — TELEPHONE (OUTPATIENT)
Dept: NEUROSURGERY | Facility: CLINIC | Age: 44
End: 2024-10-15
Payer: COMMERCIAL

## 2024-10-16 ENCOUNTER — TREATMENT (OUTPATIENT)
Dept: PHYSICAL THERAPY | Facility: CLINIC | Age: 44
End: 2024-10-16
Payer: COMMERCIAL

## 2024-10-16 DIAGNOSIS — S46.812D STRAIN OF OTHER MUSCLES, FASCIA AND TENDONS AT SHOULDER AND UPPER ARM LEVEL, LEFT ARM, SUBSEQUENT ENCOUNTER: Primary | ICD-10-CM

## 2024-10-16 DIAGNOSIS — M54.12 RADICULOPATHY, CERVICAL REGION: ICD-10-CM

## 2024-10-16 DIAGNOSIS — S23.3XXD SPRAIN OF LIGAMENTS OF THORACIC SPINE, SUBSEQUENT ENCOUNTER: ICD-10-CM
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
## 2024-10-18 ENCOUNTER — TREATMENT (OUTPATIENT)
Dept: PHYSICAL THERAPY | Facility: CLINIC | Age: 44
End: 2024-10-18
Payer: COMMERCIAL

## 2024-10-18 DIAGNOSIS — S23.3XXD SPRAIN OF LIGAMENTS OF THORACIC SPINE, SUBSEQUENT ENCOUNTER: ICD-10-CM
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
## 2024-10-18 DIAGNOSIS — S46.812D STRAIN OF OTHER MUSCLES, FASCIA AND TENDONS AT SHOULDER AND UPPER ARM LEVEL, LEFT ARM, SUBSEQUENT ENCOUNTER: Primary | ICD-10-CM

## 2024-10-18 DIAGNOSIS — M54.12 RADICULOPATHY, CERVICAL REGION: ICD-10-CM

## 2024-10-21 ENCOUNTER — TREATMENT (OUTPATIENT)
Dept: PHYSICAL THERAPY | Facility: CLINIC | Age: 44
End: 2024-10-21
Payer: COMMERCIAL
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
## 2024-10-21 DIAGNOSIS — M54.12 RADICULOPATHY, CERVICAL REGION: ICD-10-CM

## 2024-10-21 DIAGNOSIS — S46.812D STRAIN OF OTHER MUSCLES, FASCIA AND TENDONS AT SHOULDER AND UPPER ARM LEVEL, LEFT ARM, SUBSEQUENT ENCOUNTER: Primary | ICD-10-CM

## 2024-10-21 DIAGNOSIS — S23.3XXD SPRAIN OF LIGAMENTS OF THORACIC SPINE, SUBSEQUENT ENCOUNTER: ICD-10-CM

## 2024-10-21 PROCEDURE — 97112 NEUROMUSCULAR REEDUCATION: CPT | Performed by: PHYSICAL THERAPIST

## 2024-10-21 PROCEDURE — 97110 THERAPEUTIC EXERCISES: CPT | Performed by: PHYSICAL THERAPIST

## 2024-10-21 PROCEDURE — 97140 MANUAL THERAPY 1/> REGIONS: CPT | Performed by: PHYSICAL THERAPIST

## 2024-10-21 PROCEDURE — 97012 MECHANICAL TRACTION THERAPY: CPT | Performed by: PHYSICAL THERAPIST

## 2024-10-24 ENCOUNTER — OFFICE VISIT (OUTPATIENT)
Dept: FAMILY MEDICINE CLINIC | Facility: CLINIC | Age: 44
End: 2024-10-24
Payer: COMMERCIAL

## 2024-10-24 VITALS
BODY MASS INDEX: 36.16 KG/M2 | WEIGHT: 225 LBS | DIASTOLIC BLOOD PRESSURE: 90 MMHG | HEIGHT: 66 IN | SYSTOLIC BLOOD PRESSURE: 140 MMHG | TEMPERATURE: 97.8 F | HEART RATE: 65 BPM | OXYGEN SATURATION: 98 %

## 2024-10-24 DIAGNOSIS — Z12.4 SCREENING FOR CERVICAL CANCER: ICD-10-CM

## 2024-10-24 DIAGNOSIS — I10 PRIMARY HYPERTENSION: Primary | ICD-10-CM

## 2024-10-24 PROCEDURE — 99213 OFFICE O/P EST LOW 20 MIN: CPT | Performed by: FAMILY MEDICINE

## 2024-10-24 RX ORDER — LOSARTAN POTASSIUM 100 MG/1
50 TABLET ORAL DAILY
Qty: 90 TABLET | Refills: 3 | Status: SHIPPED | OUTPATIENT
Start: 2024-10-24

## 2024-10-25 ENCOUNTER — HOSPITAL ENCOUNTER (OUTPATIENT)
Dept: MAMMOGRAPHY | Facility: HOSPITAL | Age: 44
Discharge: HOME | End: 2024-10-25
Admitting: FAMILY MEDICINE
Payer: COMMERCIAL

## 2024-10-25 DIAGNOSIS — Z12.31 ENCOUNTER FOR SCREENING MAMMOGRAM FOR MALIGNANT NEOPLASM OF BREAST: ICD-10-CM

## 2024-10-25 PROCEDURE — 77067 SCR MAMMO BI INCL CAD: CPT

## 2024-10-25 PROCEDURE — 77063 BREAST TOMOSYNTHESIS BI: CPT

## 2024-10-28 ENCOUNTER — TREATMENT (OUTPATIENT)
Dept: PHYSICAL THERAPY | Facility: CLINIC | Age: 44
End: 2024-10-28
Payer: COMMERCIAL

## 2024-10-28 DIAGNOSIS — S46.812D STRAIN OF OTHER MUSCLES, FASCIA AND TENDONS AT SHOULDER AND UPPER ARM LEVEL, LEFT ARM, SUBSEQUENT ENCOUNTER: Primary | ICD-10-CM
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
## 2024-10-28 DIAGNOSIS — S23.3XXD SPRAIN OF LIGAMENTS OF THORACIC SPINE, SUBSEQUENT ENCOUNTER: ICD-10-CM

## 2024-10-28 DIAGNOSIS — M54.12 RADICULOPATHY, CERVICAL REGION: ICD-10-CM

## 2024-10-28 PROCEDURE — 97012 MECHANICAL TRACTION THERAPY: CPT | Performed by: PHYSICAL THERAPIST

## 2024-10-28 PROCEDURE — 97140 MANUAL THERAPY 1/> REGIONS: CPT | Performed by: PHYSICAL THERAPIST

## 2024-10-28 PROCEDURE — 97110 THERAPEUTIC EXERCISES: CPT | Performed by: PHYSICAL THERAPIST

## 2024-10-28 NOTE — PROGRESS NOTES
Physical Therapy Daily Treatment Note      [facility] [place]              [address], [address]                [facility] [place]  [address]        Patient: Smitha Bruno   : 1980  Diagnosis/ICD-10 Code:  Strain of other muscles, fascia and tendons at shoulder and upper arm level, left arm, subsequent encounter [S46.812D]  Referring practitioner: ALEC Martin  Date of Initial Visit: Type: THERAPY  Noted: 7/3/2024  Today's Date: 10/28/2024  Patient seen for 22 sessions         Subjective    Smitha Bruno reports: it is stiff and tingly.   She said she thinks the weather is part of it.  She could tell it stiffen up as the temperature dropped last night.  Epidural on Thursday.           Objective   See Exercise, Manual, and Modality Logs for complete treatment.     Hypertonic (L) UT with trigger point    Assessment/Plan  Pt continued to have significant tightness in (L) UT and increased tingling in UE with deeper pressure thus modified to tolerable pressure and held use of massage star.  Continued with scapular strengthening as noted.  No increased complaints of pain or symptoms and minimal cues required for technique with exercises.  Will monitor and progress as able.        Progress per Plan of Care           Timed:  Manual Therapy:    8     mins  88587;  Therapeutic Exercise:    20     mins  07339;     Neuromuscular Sayda:        mins  55776;    Therapeutic Activity:          mins  86171;     Gait Training:           mins  65301;     Ultrasound:          mins  09354;     Work Conditioning/Hardening (initial 2 hours)        mins  13584  Work Conditioning/Hardening (each add'l hour)        mins  72841    Untimed:   Electrical Stimulation:         mins  96834 ( );  Traction     15     mins 96219    Timed Treatment:   28   mins   Total Treatment:     43   mins    Joselin Vyas PTA  Physical Therapist Assistant

## 2024-10-30 ENCOUNTER — TREATMENT (OUTPATIENT)
Dept: PHYSICAL THERAPY | Facility: CLINIC | Age: 44
End: 2024-10-30
Payer: COMMERCIAL
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
## 2024-10-30 DIAGNOSIS — S46.812D STRAIN OF OTHER MUSCLES, FASCIA AND TENDONS AT SHOULDER AND UPPER ARM LEVEL, LEFT ARM, SUBSEQUENT ENCOUNTER: Primary | ICD-10-CM

## 2024-10-30 DIAGNOSIS — M54.12 RADICULOPATHY, CERVICAL REGION: ICD-10-CM

## 2024-10-30 DIAGNOSIS — S23.3XXD SPRAIN OF LIGAMENTS OF THORACIC SPINE, SUBSEQUENT ENCOUNTER: ICD-10-CM
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
## 2024-10-30 LAB
AGE GDLN ACOG TESTING: NORMAL
CYTOLOGIST CVX/VAG CYTO: NORMAL
CYTOLOGY CVX/VAG DOC CYTO: NORMAL
CYTOLOGY CVX/VAG DOC THIN PREP: NORMAL
DX ICD CODE: NORMAL
HPV GENOTYPE REFLEX: NORMAL
HPV I/H RISK 4 DNA CVX QL PROBE+SIG AMP: NEGATIVE
Lab: NORMAL
OTHER STN SPEC: NORMAL
STAT OF ADQ CVX/VAG CYTO-IMP: NORMAL

## 2024-10-30 NOTE — PROGRESS NOTES
Physical Therapy Daily Treatment Note      [facility] [place]              [address], [address]                [facility] [place]  [address]        Patient: Smitha Brnuo   : 1980  Diagnosis/ICD-10 Code:  Strain of other muscles, fascia and tendons at shoulder and upper arm level, left arm, subsequent encounter [S46.812D]  Referring practitioner: ALEC Martin  Date of Initial Visit: Type: THERAPY  Noted: 7/3/2024  Today's Date: 10/30/2024  Patient seen for 23 sessions         Subjective    Smitha Bruno reports: no change.  She said it is still tingling and tight.  She gets her epidural tomorrow.           Objective   See Exercise, Manual, and Modality Logs for complete treatment.       Assessment/Plan  Minimal progressions made today and cautious with exercises overall as pt will be having epidural tomorrow and want to minimize risk of flare up prior to procedure.  She tolerated exercises without complaints.  Continued with traction for stretching and pain relief.  Will monitor following epidural and progress as able.      Progress per Plan of Care           Timed:  Manual Therapy:    10     mins  94276;  Therapeutic Exercise:    25     mins  83120;     Neuromuscular Sayda:        mins  53640;    Therapeutic Activity:          mins  82115;     Gait Training:           mins  33528;     Ultrasound:          mins  37006;     Work Conditioning/Hardening (initial 2 hours)        mins  59778  Work Conditioning/Hardening (each add'l hour)        mins  74110    Untimed:   Electrical Stimulation:         mins  51374 ( );  Traction     15     mins 94397    Timed Treatment:   35   mins   Total Treatment:     50   mins    Joselin Vyas PTA  Physical Therapist Assistant

## 2024-11-01 RX ORDER — GABAPENTIN 300 MG/1
300 CAPSULE ORAL 3 TIMES DAILY
Qty: 90 CAPSULE | Refills: 0 | Status: SHIPPED | OUTPATIENT
Start: 2024-11-01

## 2024-11-01 NOTE — PROGRESS NOTES
Subjective   History of Present Illness: Smitha Bruno is a 44 y.o. female is here today for follow-up.  Patient initially seen and evaluated by myself on 8/28/2024 for neck and left arm pain and paresthesias.  This started 2 months ago when she was moving a bariatric patient at work.  Her clinical presentation was concordant with imaging that demonstrated a disc herniation off to the left side at C6-C7 with mass effect on the left cord and left C7 nerve root.  Physical exam was reassuring with no upper motor neuron signs.  Given the acuity recommendations including continue with targeted conservative therapy and injection therapy were recommended.  Patient underwent an injection approximately 2 weeks ago with no significant betterment of her symptoms.  She continues to complain of left-sided arm pain with soreness of her tricep and burning under her forearm with numbness and tingling in mainly the middle digit of her left hand.   Overall her symptoms have somewhat bettered with physical therapy and her gabapentin therapy but still significantly bothersome.  She does report getting most of her improvement from traction therapy.  She describes no acute neurologic complaints or symptoms.  History of Present Illness    The following portions of the patient's history were reviewed and updated as appropriate: allergies, current medications, past family history, past medical history, past social history, past surgical history, and problem list.    Review of Systems   Constitutional:  Positive for activity change.   HENT: Negative.     Eyes: Negative.    Respiratory: Negative.     Cardiovascular: Negative.    Gastrointestinal: Negative.    Endocrine: Negative.    Genitourinary: Negative.    Musculoskeletal:  Positive for arthralgias, myalgias and neck pain (Left arm pain).   Skin: Negative.    Allergic/Immunologic: Negative.    Neurological:  Positive for weakness (left hand) and numbness (tingling/left arm/fingers).  "  Hematological: Negative.    Psychiatric/Behavioral:  Positive for sleep disturbance.    All other systems reviewed and are negative.      Objective     .BP (!) 171/116 (BP Location: Right arm, Patient Position: Sitting)   Pulse 78   Ht 167.6 cm (66\")   Wt 103 kg (226 lb)   SpO2 98%   BMI 36.48 kg/m²    Body mass index is 36.48 kg/m².    Vitals:    11/13/24 1106   PainSc:   4          Physical Exam  Neurological Exam  4+/5 left tricep  Bilateral  5/5      Assessment & Plan   Independent Review of Radiographic Studies:      I personally reviewed the images from the following studies.    No new imaging     Medical Decision Making:      Smitha Wooilis a 44 y.o. female following back up on her neck and left arm pain and paresthesias and after undergoing cervical injection.  Patient has imaging concordant with moderate amount of canal stenosis with superimposed left paracentral disc herniation.  Patient's symptoms still bothersome but overall feel like they are slowly bettering.  I do believe the patient should undergo another injection and continue with the gabapentin therapy and have ordered another course of physical therapy with traction.   If she does fail these modalities, then she likely would need to follow-up with Dr. Correa for potential surgical discussion.She is in agreement of this and wants to hold off on any surgery as a last resort.  I encouraged to call the office with any questions or concerns.    Diagnoses and all orders for this visit:    1. DDD (degenerative disc disease), cervical (Primary)  -     XR spine cervical ap and lat w flex and ext; Future  -     Miscellaneous DME  -     Epidural Block  -     Ambulatory Referral to Physical Therapy for Evaluation & Treatment    2. Cervical disc herniation      Return if symptoms worsen or fail to improve, for f/u with Dr. Correa.    This patient was examined wearing appropriate personal protective equipment.     Smitha Bruno  reports that she " has never smoked. She has been exposed to tobacco smoke. She has never used smokeless tobacco.   Body mass index is 36.48 kg/m².  Class 2 Severe Obesity (BMI >=35 and <=39.9). Obesity-related health conditions include the following: HLD, HTN.  obesity is unchanged. BMI is above average; BMI management plan is completed.  Recommendations for portion control and increasing exercise.     Patient's blood pressure was reviewed.  Recommendations for  a low-salt diet and exercise to maintain/improve BP in addition to taking any presribed medications.             Roxanna Flaherty DNP, APRN  11/13/24  11:56 EST

## 2024-11-04 ENCOUNTER — TREATMENT (OUTPATIENT)
Dept: PHYSICAL THERAPY | Facility: CLINIC | Age: 44
End: 2024-11-04
Payer: COMMERCIAL
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
## 2024-11-04 DIAGNOSIS — M54.12 RADICULOPATHY, CERVICAL REGION: ICD-10-CM

## 2024-11-04 DIAGNOSIS — S46.812D STRAIN OF OTHER MUSCLES, FASCIA AND TENDONS AT SHOULDER AND UPPER ARM LEVEL, LEFT ARM, SUBSEQUENT ENCOUNTER: Primary | ICD-10-CM

## 2024-11-04 DIAGNOSIS — S23.3XXD SPRAIN OF LIGAMENTS OF THORACIC SPINE, SUBSEQUENT ENCOUNTER: ICD-10-CM
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
## 2024-11-04 PROCEDURE — 97110 THERAPEUTIC EXERCISES: CPT | Performed by: PHYSICAL THERAPIST

## 2024-11-04 PROCEDURE — 97014 ELECTRIC STIMULATION THERAPY: CPT | Performed by: PHYSICAL THERAPIST

## 2024-11-04 PROCEDURE — 97112 NEUROMUSCULAR REEDUCATION: CPT | Performed by: PHYSICAL THERAPIST

## 2024-11-04 PROCEDURE — 97140 MANUAL THERAPY 1/> REGIONS: CPT | Performed by: PHYSICAL THERAPIST

## 2024-11-04 NOTE — PROGRESS NOTES
Physical Therapy Daily Treatment Note    Patient: Smitha Bruno  : 1980  Referring practitioner: ALEC Martin  Today's Date: 2024    VISIT#: 24      Subjective   Smitha Bruno reports: Doing ok, got epidural injection last Thursday and symptoms were worse after and are still worse. Left arm is still intermittently tingling, but not also has sharp stabbing pain in her forearm - it's maybe a little better now but still there. Started taking gabapentin.       Objective     See Exercise, Manual, and Modality Logs for complete treatment.     Patient Education: continue HEP.     Assessment/Plan  Good response to session, avoided traction today (manual or mechanical) due to recent epidural injection and increased arm pain since then. May return to this next session. Focused on decreasing muscle guarding and postural alignment today. Also estim & heat due to increased muscle guarding, KT tape applied as well.     Progress per Plan of Care            Timed:         Manual Therapy:    15     mins  97987;     Therapeutic Exercise:    10     mins  57607;     Neuromuscular Sayda:    8    mins  18654;    Therapeutic Activity:          mins  84215;     Gait Training:           mins  36340;     Ultrasound:          mins  45132;    Ionto:                                   mins   51180  Self Care:                            mins   12108    Un-Timed:  Electrical Stimulation:    15     mins  13327 ( );  Dry Needling          mins self-pay  Traction          mins 22027  Re-Eval                               mins  92735    Timed Treatment:   33   mins   Total Treatment:     48   mins    Sue Wiggins, PT  Physical Therapist  [place] PT license #: [id number]  [place] PT license #: [id number]

## 2024-11-06 ENCOUNTER — TREATMENT (OUTPATIENT)
Dept: PHYSICAL THERAPY | Facility: CLINIC | Age: 44
End: 2024-11-06
Payer: COMMERCIAL

## 2024-11-06 DIAGNOSIS — M54.12 RADICULOPATHY, CERVICAL REGION: ICD-10-CM

## 2024-11-06 DIAGNOSIS — S23.3XXD SPRAIN OF LIGAMENTS OF THORACIC SPINE, SUBSEQUENT ENCOUNTER: ICD-10-CM
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
## 2024-11-06 DIAGNOSIS — S46.812D STRAIN OF OTHER MUSCLES, FASCIA AND TENDONS AT SHOULDER AND UPPER ARM LEVEL, LEFT ARM, SUBSEQUENT ENCOUNTER: Primary | ICD-10-CM

## 2024-11-06 NOTE — PROGRESS NOTES
Physical Therapy Daily Treatment Note      [facility] [place]              [address], [address]                [facility] [address] beds at the [facility] [place]  [address]        Patient: Smitha Bruno   : 1980  Diagnosis/ICD-10 Code:  Strain of other muscles, fascia and tendons at shoulder and upper arm level, left arm, subsequent encounter [S46.812D]  Referring practitioner: ALEC Martin  Date of Initial Visit: Type: THERAPY  Noted: 7/3/2024  Today's Date: 2024  Patient seen for 25 sessions         Subjective    Smitha Bruno reports: so far she is not a fan on the epidural.  The underside of her arm is still very tender.  She said it hurts today and is more than just the tingling.             Objective   See Exercise, Manual, and Modality Logs for complete treatment.       Assessment/Plan  Reintroduced several exercises as noted however continued to be cautious to avoid aggravation of symptoms.  Also continued with manual to address cervical and thoracic tightness.  Mechanical traction also reintroduced with decreased tenderness in arm following.  Discussed obtaining home traction unit however will wait 1-2 weeks for full results from epidural.     Progress per Plan of Care           Timed:  Manual Therapy:    10     mins  60551;  Therapeutic Exercise:    15     mins  15807;     Neuromuscular Sayda:        mins  03339;    Therapeutic Activity:          mins  30574;     Gait Training:           mins  93630;     Ultrasound:          mins  90027;     Work Conditioning/Hardening (initial 2 hours)        mins  35117  Work Conditioning/Hardening (each add'l hour)        mins  49839    Untimed:   Electrical Stimulation:         mins  79962 ( );  Traction     15     mins 14892    Timed Treatment:   25   mins   Total Treatment:     40   mins    Joselin Vyas PTA  Physical Therapist Assistant

## 2024-11-12 ENCOUNTER — TREATMENT (OUTPATIENT)
Dept: PHYSICAL THERAPY | Facility: CLINIC | Age: 44
End: 2024-11-12
Payer: COMMERCIAL
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
## 2024-11-12 DIAGNOSIS — M54.12 RADICULOPATHY, CERVICAL REGION: ICD-10-CM

## 2024-11-12 DIAGNOSIS — S23.3XXD SPRAIN OF LIGAMENTS OF THORACIC SPINE, SUBSEQUENT ENCOUNTER: ICD-10-CM

## 2024-11-12 DIAGNOSIS — S46.812D STRAIN OF OTHER MUSCLES, FASCIA AND TENDONS AT SHOULDER AND UPPER ARM LEVEL, LEFT ARM, SUBSEQUENT ENCOUNTER: Primary | ICD-10-CM

## 2024-11-12 NOTE — PROGRESS NOTES
Physical Therapy Progress Note/Re-assessment    Patient: Smitha Bruno  : 1980  Referring practitioner: ALEC Martin  Today's Date: 2024    VISIT#: 26      Subjective   Smitha Bruno reports: Doing ok, arm still hurting and N/T is still there, is still more flared up since the injection. Seeing Roxanna Flaherty tomorrow in the spine office.       Objective     Palpation   Left   Hypertonic in the rhomboids and upper trapezius.   Tenderness of the rhomboids and upper trapezius.      Sensation  Mild numbness left finger tips and general numbness in hand     Active Range of Motion      Cervical:  Flexion: WFL -    Extension: WFL -    Left lateral flexion: 45 degrees   Right lateral flexion: 45 degrees   Left rotation: 80 degrees   Right rotation: 80 degrees      Left Shoulder   Flexion: 140 degrees (right shoulder flexion 145 degrees)  Abduction: 125 degrees      Strength/Myotome Testing      Right UE  Normal strength     Left shoulder  Flexion: 4+   External rotation at 0°: 5   Internal rotation at 0°: 5      Left Elbow   Flexion: 5  Extension: 4-     Left Wrist/Hand   Wrist extension: 5  Wrist flexion: 5     Additional Strength Details  R  strength: 100#  L : 68#     Tests   Cervical      Cervical distraction decreases LUE tingling     See Exercise, Manual, and Modality Logs for complete treatment.     Patient Education: continue HEP    Assessment & Plan       Assessment  Assessment details: Smitha progress with therapy has seen a bit of a plateau. However she continues to get relief with mechanical traction. Discussed potential for home traction unit and instructed her to discuss this with Roxanna at her appointment tomorrow.      She continues to demonstrate tricep and  weakness as well as N/T and pain down her left arm. She has met 4 STGs. Requires continued PT to address remaining deficits and return to PLOF.     Plan  Therapy options: will be seen for skilled therapy  services  Treatment plan discussed with: patient          ST. Pt will be independent and compliant with initial HEP in 4 weeks. Met   2. Pt will report a 25% improvement in symptoms since starting therapy in 4 weeks. Met  (50%) - slight setback since injection  3. Pt will report pain level at worst <5 during sitting activity in 4 weeks. Progressing (3/10)  4. Pt will be independent with postural corrections in 2 weeks in order to decrease strain on cervical and thoracic spine. Met   5. Pt will improve left elbow extension strength to 4+/5 in 4 weeks. Not met  6. Pt will have no pain in left arm in 4 weeks. Met      LT. Pt will be independent with final HEP for self-management of condition by DC.  2. Pt will improve score on NDI to less than 10% by DC.   3. Pt will report a 75% improvement in symptoms by DC in order to allow return to PLOF.  4. Pt will increase cervical AROM in all directions to > WNL and without causing L shoulder blade or LUE symptoms in order to improve ability to drive by DC.   5. Pt will be able to push/pull up to 100 lbs and lift up to 20# without pain and with good mechanics in order to be able to return to work by DC.         Progress per Plan of Care            Timed:         Manual Therapy:         mins  36063;     Therapeutic Exercise:    10     mins  97995;     Neuromuscular Sayda:        mins  55930;    Therapeutic Activity:     10     mins  69004;     Gait Training:           mins  95917;     Ultrasound:          mins  85491;    Ionto:                                   mins   79508  Self Care:                            mins   35668    Un-Timed:  Electrical Stimulation:         mins  12817 ( );  Dry Needling          mins self-pay  Traction     15     mins 55355  Re-Eval                               mins  97244    Timed Treatment:   20   mins   Total Treatment:     35   mins    Sue Wiggins, PT  Physical Therapist  [place] PT license #: [id number]  [place] PT license  #: [id number]

## 2024-11-13 ENCOUNTER — OFFICE VISIT (OUTPATIENT)
Dept: NEUROSURGERY | Facility: CLINIC | Age: 44
End: 2024-11-13
Payer: COMMERCIAL

## 2024-11-13 VITALS
HEART RATE: 78 BPM | DIASTOLIC BLOOD PRESSURE: 116 MMHG | WEIGHT: 226 LBS | BODY MASS INDEX: 36.32 KG/M2 | OXYGEN SATURATION: 98 % | SYSTOLIC BLOOD PRESSURE: 171 MMHG | HEIGHT: 66 IN

## 2024-11-13 DIAGNOSIS — M50.30 DDD (DEGENERATIVE DISC DISEASE), CERVICAL: Primary | ICD-10-CM

## 2024-11-13 DIAGNOSIS — M50.20 CERVICAL DISC HERNIATION: ICD-10-CM

## 2024-11-13 PROCEDURE — 99213 OFFICE O/P EST LOW 20 MIN: CPT | Performed by: NURSE PRACTITIONER

## 2024-11-25 ENCOUNTER — TELEPHONE (OUTPATIENT)
Dept: NEUROSURGERY | Facility: CLINIC | Age: 44
End: 2024-11-25
Payer: COMMERCIAL

## 2024-11-25 NOTE — TELEPHONE ENCOUNTER
FAXED OFFICE NOTE AND ORDERS TO [facility], SEE CONFIRMATION BELOW. ATTEMPTED TO NOTIFY PATIENT, NO ANSWER. LEFT DETAILED VOICE MAIL INFORMING HER OF THI AND PROVIDED OFFICE NUMBER IF SHE NEEDS FURTHER ASSISTANCE.

## 2024-11-25 NOTE — TELEPHONE ENCOUNTER
Caller: JOSE C    Relationship: SELF    Best call back number: [phone]    What form or medical record are you requesting: OV NOTES     Who is requesting this form or medical record from you: YISEL CALABRESE     How would you like to receive the form or medical records (pick-up, mail, fax): FAX  If fax, what is the fax number:   If mail, what is the address:   If pick-up, provide patient with address and location details    Timeframe paperwork needed: ASAP    Additional notes: PATIENT CALLED STATING THAT [facility] ARE STILL WAITING FOR OV NOTES FROM 11.13.24 - ALSO NEEDING A COPY OF ORDERS FOR 2ND EPIDURAL & PT     PATIENT DOES NOT HAVE FAX # - STATES [facility] IS WORKING ON THIS AND SHLD HAVE IT     PLEASE ADVISE  THANK YOU

## 2024-11-26 RX ORDER — GABAPENTIN 300 MG/1
CAPSULE ORAL
Qty: 90 CAPSULE | Refills: 0 | Status: SHIPPED | OUTPATIENT
Start: 2024-11-26

## 2024-11-26 NOTE — TELEPHONE ENCOUNTER
LUCY CALABRESE [phone] @ YISEL CALLED STATING HE HAS NOT RECEIVED PAPERWORK - ASKING FOR PAPERWORK TO BE RE-FAXED -[phone] CLAIM # [id number]    THANK YOU

## 2024-11-26 NOTE — TELEPHONE ENCOUNTER
Rx Refill Note  Requested Prescriptions     Pending Prescriptions Disp Refills    gabapentin (NEURONTIN) 300 MG capsule [Pharmacy Med Name: Gabapentin 300 MG Oral Capsule] 90 capsule 0     Sig: TAKE 1 CAPSULE BY MOUTH THREE TIMES DAILY. START NIGHTLY AND TITRATE UP TO RECOMMENDED DOSAGE      Last office visit with prescribing clinician: 11/13/2024   Last telemedicine visit with prescribing clinician: Visit date not found   Next office visit with prescribing clinician: Visit date not found                         Would you like a call back once the refill request has been completed: [] Yes [] No    If the office needs to give you a call back, can they leave a voicemail: [] Yes [] No    Ramón Isaac MA  11/26/24, 10:14 EST

## 2024-11-27 RX ORDER — ESCITALOPRAM OXALATE 20 MG/1
20 TABLET ORAL DAILY
Qty: 90 TABLET | Refills: 0 | Status: SHIPPED | OUTPATIENT
Start: 2024-11-27

## 2024-11-27 RX ORDER — METOPROLOL SUCCINATE 50 MG/1
100 TABLET, EXTENDED RELEASE ORAL DAILY
Qty: 180 TABLET | Refills: 0 | Status: SHIPPED | OUTPATIENT
Start: 2024-11-27

## 2024-12-06 ENCOUNTER — TELEPHONE (OUTPATIENT)
Dept: NEUROSURGERY | Facility: OTHER | Age: 44
End: 2024-12-06
Payer: COMMERCIAL

## 2024-12-06 NOTE — TELEPHONE ENCOUNTER
Caller: JOSE C    Relationship: SELF    Best call back number: [phone]    What orders are you requesting (i.e. lab or imaging): BACK INJECTION      Where will you receive your lab/imaging services: [facility]    Additional notes: THE PATIENT UNDERSTOOD THAT A DIFFERENT TYPE OF EPIDURAL WAS BEING ORDERED THAN SHE RECEIVED LAST TIME- THE ONE SHE RECEIVED BEFORE DID NOT HELP & MADE SYMPTOMS WORSE- PLEASE CLARIFY

## 2024-12-09 NOTE — TELEPHONE ENCOUNTER
Called and let patient know the message. She wants to know if is the same type of injection as far as the formula. She dont mind it being in the same spot.

## 2024-12-10 ENCOUNTER — TREATMENT (OUTPATIENT)
Dept: PHYSICAL THERAPY | Facility: CLINIC | Age: 44
End: 2024-12-10
Payer: COMMERCIAL

## 2024-12-10 DIAGNOSIS — M54.12 RADICULOPATHY, CERVICAL REGION: ICD-10-CM
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
## 2024-12-10 DIAGNOSIS — S46.812D STRAIN OF OTHER MUSCLES, FASCIA AND TENDONS AT SHOULDER AND UPPER ARM LEVEL, LEFT ARM, SUBSEQUENT ENCOUNTER: Primary | ICD-10-CM
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
## 2024-12-10 DIAGNOSIS — S23.3XXD SPRAIN OF LIGAMENTS OF THORACIC SPINE, SUBSEQUENT ENCOUNTER: ICD-10-CM

## 2024-12-10 PROCEDURE — 97012 MECHANICAL TRACTION THERAPY: CPT

## 2024-12-10 PROCEDURE — 97112 NEUROMUSCULAR REEDUCATION: CPT

## 2024-12-10 PROCEDURE — 97110 THERAPEUTIC EXERCISES: CPT

## 2024-12-10 NOTE — PROGRESS NOTES
"Physical Therapy Progress Note    Patient: Smitha Bruno  : 1980  Referring practitioner: ALEC Martin  Today's Date: 12/10/2024    VISIT#: 27    NDI: 2050=40% disability   Revised Oswestry: 50 = 38% disability    Subjective   Smitha Bruno reports: Pt reports she's having her second epidural on Thursday. States the first epidural didn't help much and it actually onset new symptoms. Reports she has been taking gabapentin, which has helped to decrease her pain some. States if upcoming epidural doesn't improve symptoms, she will need to follow up with her neurosurgeon to discuss surgical options.     Reports her exercises are going \"pretty good\" states they aren't hurting anything, mostly maintaining strength.  States traction has helped her most since beginning PT. States MD ordered a traction unit for home.    Pain  Current: 3/10 pain level in neck and shoulder on L side.   Worst: 3/10  Best: 0/10      Objective       Palpation   Left   Hypertonic in the rhomboids and upper trapezius.   Tenderness of the rhomboids and upper trapezius.      Sensation  Mild numbness left finger tips (digits 1-3) and general numbness in hand     Active Range of Motion      Cervical:  Flexion: WFL -    Extension: WFL -    Left lateral flexion: 45 degrees   Right lateral flexion: 45 degrees   Left rotation: 80 degrees   Right rotation: 80 degrees      Left Shoulder   Flexion: 140 degrees (right shoulder flexion 145 degrees)  Abduction: 148 degrees      Strength/Myotome Testing      Right UE  Normal strength     Left shoulder  Flexion: 4+   External rotation at 0°: 5   Internal rotation at 0°: 5      Left Elbow   Flexion: 5  Extension: 4     Left Wrist/Hand   Wrist extension: 5  Wrist flexion: 5     Additional Strength Details  R  strength: 100#  L : 68#     Tests   Cervical     Cervical distraction decreases LUE tingling      See Exercise, Manual, and Modality Logs for complete treatment.     ST. Pt " will be independent and compliant with initial HEP in 4 weeks. Met   2. Pt will report a 25% improvement in symptoms since starting therapy in 4 weeks. Met  (50%) - states her symptoms are progressively worsening  3. Pt will report pain level at worst <5 during sitting activity in 4 weeks. MET - Worst pain 3/10. N/T increases with prolonged sitting.  4. Pt will be independent with postural corrections in 2 weeks in order to decrease strain on cervical and thoracic spine. Met   5. Pt will improve left elbow extension strength to 4+/5 in 4 weeks. Not met  6. Pt will have no pain in left arm in 4 weeks. Met      LT. Pt will be independent with final HEP for self-management of condition by DC. Not met  2. Pt will improve score on NDI to less than 10% by DC. Not met  3. Pt will report a 75% improvement in symptoms by DC in order to allow return to PLOF. Not met  4. Pt will increase cervical AROM in all directions to > WNL and without causing L shoulder blade or LUE symptoms in order to improve ability to drive by DC. Not Met  5. Pt will be able to push/pull up to 100 lbs and lift up to 20# without pain and with good mechanics in order to be able to return to work by DC Not met    Patient Education: Continue with HEP.    Assessment/Plan  Patient presents with gradual worsening of symptoms reported after first epidural. States pain is mild, but N/T is moderate to severe with prolonged sitting, driving, lifting/carrying objects and sleeping secondary to her symptoms. Patient reports only finding relief from mechanical traction in PT, where she notes decreased pain and N/T in her L UE. Plan for patient to continue with PT 2x/week and monitor symptoms after epidural on Thursday.    Progress per Plan of Care and Progress strengthening /stabilization /functional activity           Timed:         Manual Therapy:         mins  07947;     Therapeutic Exercise:    19     mins  57192;     Neuromuscular Sayda:    11    mins   67225;    Therapeutic Activity:          mins  61984;     Gait Training:           mins  85320;     Ultrasound:          mins  95238;    Ionto                                  mins   47667  Self Care                            mins   67623      Un-Timed:  Electrical Stimulation:         mins  29551 ( );  Dry Needling          mins self-pay  Traction     15     mins 84980  Re-Eval                               mins  14177  Canalith Repos                   mins 93625    Timed Treatment:   30   mins   Total Treatment:     45   mins    Heena Chew, PT  Physical Therapist   License: [id number]

## 2024-12-16 ENCOUNTER — TELEPHONE (OUTPATIENT)
Dept: NEUROSURGERY | Facility: CLINIC | Age: 44
End: 2024-12-16
Payer: COMMERCIAL

## 2024-12-16 ENCOUNTER — TREATMENT (OUTPATIENT)
Dept: PHYSICAL THERAPY | Facility: CLINIC | Age: 44
End: 2024-12-16
Payer: COMMERCIAL

## 2024-12-16 DIAGNOSIS — S23.3XXD SPRAIN OF LIGAMENTS OF THORACIC SPINE, SUBSEQUENT ENCOUNTER: ICD-10-CM

## 2024-12-16 DIAGNOSIS — M54.12 RADICULOPATHY, CERVICAL REGION: ICD-10-CM
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
## 2024-12-16 DIAGNOSIS — S46.812D STRAIN OF OTHER MUSCLES, FASCIA AND TENDONS AT SHOULDER AND UPPER ARM LEVEL, LEFT ARM, SUBSEQUENT ENCOUNTER: Primary | ICD-10-CM

## 2024-12-16 PROCEDURE — 97012 MECHANICAL TRACTION THERAPY: CPT

## 2024-12-16 PROCEDURE — 97110 THERAPEUTIC EXERCISES: CPT

## 2024-12-16 PROCEDURE — 97112 NEUROMUSCULAR REEDUCATION: CPT

## 2024-12-16 NOTE — PROGRESS NOTES
Physical Therapy Daily Treatment Note    Patient: Smitha Bruno  : 1980  Referring practitioner: ALEC Martin  Today's Date: 2024    VISIT#: 28      Subjective   Smitha Bruno reports: Pt reports having her epidural on Thursday. States she has some relief, but continues to have N/T. She did not experience Inc burning and stinging this time as she did after last epidural.  Pt reports she is waiting to see how well the epidural works, then will follow up with neurosurgeon if not better, if better she will follow up with NP to be released to go back to work. 1/10 neck pain upon arrival.      Objective     See Exercise, Manual, and Modality Logs for complete treatment.     Patient Education: Continue with HEP.    Assessment/Plan  Pt tolerated exercises well with no complaints of increased symptoms. States she has established HEP she will continue with. Improvement in symptoms with mechanical cervical traction.      Progress per Plan of Care and Progress strengthening /stabilization /functional activity           Timed:         Manual Therapy:         mins  72165;     Therapeutic Exercise:    26     mins  95509;     Neuromuscular Sayda:    19    mins  27175;    Therapeutic Activity:          mins  26041;     Gait Training:           mins  85313;     Ultrasound:          mins  68243;    Ionto                                   mins   53818  Self Care                            mins   97342      Un-Timed:  Electrical Stimulation:         mins  88214 ( );  Dry Needling          mins self-pay  Traction     15     mins 65279  Re-Eval                               mins  97027  Canalith Repos                   mins 28268    Timed Treatment:   45   mins   Total Treatment:     60   mins    Heena Chew, PT  Physical Therapist   License: [id number]

## 2024-12-19 ENCOUNTER — TREATMENT (OUTPATIENT)
Dept: PHYSICAL THERAPY | Facility: CLINIC | Age: 44
End: 2024-12-19
Payer: COMMERCIAL
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
## 2024-12-19 DIAGNOSIS — M54.12 RADICULOPATHY, CERVICAL REGION: ICD-10-CM

## 2024-12-19 DIAGNOSIS — S23.3XXD SPRAIN OF LIGAMENTS OF THORACIC SPINE, SUBSEQUENT ENCOUNTER: ICD-10-CM
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
## 2024-12-19 DIAGNOSIS — S46.812D STRAIN OF OTHER MUSCLES, FASCIA AND TENDONS AT SHOULDER AND UPPER ARM LEVEL, LEFT ARM, SUBSEQUENT ENCOUNTER: Primary | ICD-10-CM

## 2024-12-19 NOTE — PROGRESS NOTES
Physical Therapy Daily Treatment Note      [facility]              [address], [address]                [place]  [address]        Patient: Smitha Bruno   : 1980  Diagnosis/ICD-10 Code:  Strain of other muscles, fascia and tendons at shoulder and upper arm level, left arm, subsequent encounter [S46.812D]  Referring practitioner: ALEC Martin  Date of Initial Visit: Type: THERAPY  Noted: 7/3/2024  Today's Date: 2024  Patient seen for 29 sessions         Subjective    Smitha Bruno reports: she got a message to schedule and follow up with Roxanna but she thought she was supposed to see Serak next so she requested an appt through [facility] [facility].  It has been a little bit better since 2nd epidural some tingling just not as often and doesn't last as long, she hasn't been taking the gabapentin, no pain right now           Objective   See Exercise, Manual, and Modality Logs for complete treatment.       Assessment/Plan  Progressed strengthening as noted to include seated positions for more functional strengthening.  She tolerated well with some fatigue noted.  Intermittent cueing for technique.  Also increase weight on traction for greater relief.  Will monitor and progress as able.               Timed:  Manual Therapy:         mins  25710;  Therapeutic Exercise:    25     mins  07101;     Neuromuscular Sayda:        mins  75443;    Therapeutic Activity:     8     mins  48642;     Gait Training:           mins  15495;     Ultrasound:          mins  23815;     Work Conditioning/Hardening (initial 2 hours)        mins  46651  Work Conditioning/Hardening (each add'l hour)        mins  32037    Untimed:   Electrical Stimulation:         mins  15587 ( );  Traction    15      mins 87684    Timed Treatment:   33   mins   Total Treatment:     48   mins    Joselin Vyas PTA  Physical Therapist Assistant  [place] License# [id number]

## 2024-12-27 ENCOUNTER — TELEPHONE (OUTPATIENT)
Dept: NEUROSURGERY | Facility: CLINIC | Age: 44
End: 2024-12-27
Payer: COMMERCIAL

## 2024-12-27 ENCOUNTER — TREATMENT (OUTPATIENT)
Dept: PHYSICAL THERAPY | Facility: CLINIC | Age: 44
End: 2024-12-27
Payer: COMMERCIAL

## 2024-12-27 DIAGNOSIS — S46.812D STRAIN OF OTHER MUSCLES, FASCIA AND TENDONS AT SHOULDER AND UPPER ARM LEVEL, LEFT ARM, SUBSEQUENT ENCOUNTER: Primary | ICD-10-CM

## 2024-12-27 DIAGNOSIS — M54.12 RADICULOPATHY, CERVICAL REGION: ICD-10-CM
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
## 2024-12-27 DIAGNOSIS — S23.3XXD SPRAIN OF LIGAMENTS OF THORACIC SPINE, SUBSEQUENT ENCOUNTER: ICD-10-CM

## 2024-12-27 NOTE — PROGRESS NOTES
Physical Therapy Daily Treatment Note      [facility] [place]              [address], [address]                [facility] [place]  [address]        Patient: Smitha Bruno   : 1980  Diagnosis/ICD-10 Code:  Strain of other muscles, fascia and tendons at shoulder and upper arm level, left arm, subsequent encounter [S46.812D]  Referring practitioner: ALEC Martin  Date of Initial Visit: Type: THERAPY  Noted: 7/3/2024  Today's Date: 2024  Patient seen for 30 sessions         Subjective    Smitha Bruno reports: her neck is stiff and tingly.  Maybe 2/10 pain.         Objective   See Exercise, Manual, and Modality Logs for complete treatment.       Assessment/Plan  Continued to focus on scapular strengthening and neck stability exercises.  Added shoulder strengthening with wall for postural feedback and to assist with return to previous level of function. Increased weight on traction today.  Will continue to monitor symptoms and progress as able with attention to increased pain or tingling.     Progress per Plan of Care           Timed:  Manual Therapy:         mins  83318;  Therapeutic Exercise:    15     mins  52445;     Neuromuscular Sayda:        mins  24417;    Therapeutic Activity:     8     mins  53994;     Gait Training:           mins  88090;     Ultrasound:          mins  20767;     Work Conditioning/Hardening (initial 2 hours)        mins  91247  Work Conditioning/Hardening (each add'l hour)        mins  36841    Untimed:   Electrical Stimulation:         mins  23298 ( );  Traction     15     mins 33930    Timed Treatment:   23   mins   Total Treatment:     38   mins    Joselin Vyas PTA  Physical Therapist Assistant  [place] License# [id number]

## 2024-12-27 NOTE — TELEPHONE ENCOUNTER
Patient called and stated she is having some betterment with the injections but it isnt taken all the symptoms. She stated she would like to follow up with Roxanna about the injections as they are helping some and dont want to follow up with  since the injections are helping. I let her know I would get her scheduled to see Roxanna.

## 2024-12-30 ENCOUNTER — TREATMENT (OUTPATIENT)
Dept: PHYSICAL THERAPY | Facility: CLINIC | Age: 44
End: 2024-12-30
Payer: COMMERCIAL
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
## 2024-12-30 DIAGNOSIS — S46.812D STRAIN OF OTHER MUSCLES, FASCIA AND TENDONS AT SHOULDER AND UPPER ARM LEVEL, LEFT ARM, SUBSEQUENT ENCOUNTER: Primary | ICD-10-CM
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
## 2024-12-30 DIAGNOSIS — M54.12 RADICULOPATHY, CERVICAL REGION: ICD-10-CM

## 2024-12-30 DIAGNOSIS — S23.3XXD SPRAIN OF LIGAMENTS OF THORACIC SPINE, SUBSEQUENT ENCOUNTER: ICD-10-CM

## 2024-12-30 DIAGNOSIS — M50.20 CERVICAL DISC HERNIATION: Primary | ICD-10-CM

## 2024-12-30 PROCEDURE — 97530 THERAPEUTIC ACTIVITIES: CPT

## 2024-12-30 PROCEDURE — 97110 THERAPEUTIC EXERCISES: CPT

## 2024-12-30 PROCEDURE — 97012 MECHANICAL TRACTION THERAPY: CPT

## 2024-12-30 NOTE — PROGRESS NOTES
Physical Therapy Daily Treatment Note      [facility] [place]              [address], [address]                [place]  [address]        Patient: Smitha Bruno   : 1980  Diagnosis/ICD-10 Code:  Strain of other muscles, fascia and tendons at shoulder and upper arm level, left arm, subsequent encounter [S46.812D]  Referring practitioner: ALEC Martin  Date of Initial Visit: Type: THERAPY  Noted: 7/3/2024  Today's Date: 2024  Patient seen for 31 sessions         Subjective    Smitha Bruno reports: her neck is a little stiff.  Maybe a 1/10 pain. She see's Roxanna on Friday but may call to reschedule with Dr. Correa.  She gave the order for the home traction unit to her  and he is helping her get it.            Objective   See Exercise, Manual, and Modality Logs for complete treatment.       Assessment/Plan  Continued to focus on strengthening and cervical stability as noted.  She tolerated exercises well including progressions made in previous session.  Will monitor and continue to progress as able.     Progress per Plan of Care           Timed:  Manual Therapy:         mins  42670;  Therapeutic Exercise:    25     mins  15621;     Neuromuscular Sayda:        mins  04908;    Therapeutic Activity:     8     mins  19909;     Gait Training:           mins  11135;     Ultrasound:          mins  26252;     Work Conditioning/Hardening (initial 2 hours)        mins  97267  Work Conditioning/Hardening (each add'l hour)        mins  72866    Untimed:   Electrical Stimulation:    15     mins  80669 ( );  Traction          mins 13985    Timed Treatment:   33   mins   Total Treatment:     48   mins    Joselin Vyas PTA  Physical Therapist Assistant  [place] License# [id number]

## 2024-12-31 ENCOUNTER — TELEPHONE (OUTPATIENT)
Dept: NEUROSURGERY | Facility: CLINIC | Age: 44
End: 2024-12-31
Payer: COMMERCIAL

## 2024-12-31 ENCOUNTER — HOSPITAL ENCOUNTER (OUTPATIENT)
Dept: GENERAL RADIOLOGY | Facility: HOSPITAL | Age: 44
Discharge: HOME | End: 2024-12-31
Admitting: NURSE PRACTITIONER
Payer: COMMERCIAL

## 2024-12-31 DIAGNOSIS — M50.30 DDD (DEGENERATIVE DISC DISEASE), CERVICAL: ICD-10-CM

## 2024-12-31 DIAGNOSIS — M50.20 CERVICAL DISC HERNIATION: Primary | ICD-10-CM

## 2024-12-31 PROCEDURE — 72050 X-RAY EXAM NECK SPINE 4/5VWS: CPT

## 2024-12-31 NOTE — PROGRESS NOTES
"Subjective   History of Present Illness: Smitha Bruno is a 44 y.o. female is here today for follow-up on neck pain.  Patient was injured at work about 7 or 8 months ago and developed significant neck pain as well as pain radiating down her left upper extremity.  She describes the pain as radiating from her neck and into her shoulder sometimes down into her forearm with tingling and numbness of her first second and third fingers.  She also complains of some weakness of the triceps on the left she has undergone 2 epidural injections neither of which provided her with lasting relief and she is also done physical therapy without lasting relief.  She has tried over-the-counter and prescription pain medication without improvement.    Chief Complaint   Patient presents with    Follow-up    Neck Pain          Previous treatment: Physical therapy, over-the-counter prescription pain medication    Previous neurosurgery:      Previous injections: 2 cervical DEISY    The following portions of the patient's history were reviewed and updated as appropriate: allergies, current medications, past family history, past medical history, past social history, past surgical history, and problem list.    Review of Systems   Constitutional:  Positive for activity change.   Musculoskeletal:  Positive for neck pain.   Neurological:  Positive for numbness.       Objective      Pulse 70   Resp 18   Ht 167.6 cm (66\")   Wt 107 kg (234 lb 12.8 oz)   SpO2 98%   BMI 37.90 kg/m²    Body mass index is 37.9 kg/m².  There were no vitals filed for this visit.      Neurological Exam  Mental Status  Awake, alert and oriented to person, place and time.    Motor   Strength is 5/5 in all four extremities except as noted.  Left tricep 4+ out of 5.    Sensory  Sensory loss over C6 and C7 dermatomes on the left.    Reflexes    Right pathological reflexes: Doan's absent.  Left pathological reflexes: Dona's absent.          Assessment & Plan "   Independent Review of Radiographic Studies:      I personally reviewed and interpreted the images from the following studies.    MRI cervical spine: Mild to moderate disc degeneration at C6-7 with left paracentral disc herniation resulting in moderate central stenosis and significant lateral recess and foraminal stenosis    Cervical flexion-extension x-ray: No spondylolisthesis or dynamic instability    Medical Decision Making:      Smitha Bruno is a 44 y.o. female who was injured at work 7 or 8 months ago with significant neck pain and left-sided radicular symptoms referable to her disc herniation at C6-7.  Patient has failed all conservative measures would benefit from surgical intervention.  We will proceed with C6-7 discectomy and placement of artificial disc.      Diagnoses and all orders for this visit:    1. Cervical radiculopathy (Primary)    2. Cervical stenosis of spine    3. DDD (degenerative disc disease), cervical      No follow-ups on file.    This patient was examined wearing appropriate personal protective equipment.                      Dr. Mo Correa IV    01/03/25  12:03 EST

## 2024-12-31 NOTE — TELEPHONE ENCOUNTER
Called patient to remind her to get her Xray before her appointment on Friday. HUB OKAY TO RELAY MESSAGE

## 2025-01-02 NOTE — TELEPHONE ENCOUNTER
Carolyn with Physical Therapy called and stated that she needs the P.T. orders Signed that had been put in on 12/3024 or new orders need to be put in. I did tell her that we will get it taken care of. She verbally understood.

## 2025-01-02 NOTE — TELEPHONE ENCOUNTER
I am still not understanding what notes or new orders need to be put in.  I have no physical therapy paperwork here to sign.

## 2025-01-03 ENCOUNTER — PREP FOR SURGERY (OUTPATIENT)
Dept: OTHER | Facility: HOSPITAL | Age: 45
End: 2025-01-03
Payer: COMMERCIAL

## 2025-01-03 ENCOUNTER — OFFICE VISIT (OUTPATIENT)
Dept: NEUROSURGERY | Facility: CLINIC | Age: 45
End: 2025-01-03
Payer: COMMERCIAL

## 2025-01-03 VITALS
WEIGHT: 234.8 LBS | RESPIRATION RATE: 18 BRPM | BODY MASS INDEX: 37.73 KG/M2 | OXYGEN SATURATION: 98 % | HEIGHT: 66 IN | HEART RATE: 70 BPM

## 2025-01-03 DIAGNOSIS — M54.12 CERVICAL RADICULOPATHY: Primary | ICD-10-CM

## 2025-01-03 DIAGNOSIS — M50.30 DDD (DEGENERATIVE DISC DISEASE), CERVICAL: ICD-10-CM

## 2025-01-03 DIAGNOSIS — M48.02 CERVICAL STENOSIS OF SPINE: Primary | ICD-10-CM

## 2025-01-03 DIAGNOSIS — M48.02 CERVICAL STENOSIS OF SPINE: ICD-10-CM

## 2025-01-09 ENCOUNTER — DOCUMENTATION (OUTPATIENT)
Dept: PHYSICAL THERAPY | Facility: CLINIC | Age: 45
End: 2025-01-09
Payer: COMMERCIAL

## 2025-01-09 ENCOUNTER — TELEPHONE (OUTPATIENT)
Dept: NEUROSURGERY | Facility: CLINIC | Age: 45
End: 2025-01-09
Payer: COMMERCIAL

## 2025-01-09 NOTE — PROGRESS NOTES
Discharge Summary  Discharge Summary from Physical Therapy Report      Dates  PT visit: 7/3/24-12/30/24  Number of Visits: 32    Discharge Status of Patient: See MD Note dated 1/3/25    Goals: Partially Met    Discharge Plan: Patient to return to referring/providing physician    Comments Pt to be dc'd at this time to prep for surgical procedure.    Date of Discharge 1/9/25        Heena Chew, PT  Physical Therapist

## 2025-01-09 NOTE — TELEPHONE ENCOUNTER
Yamile would like to CPT Codes. I gave her the code and she is asking for us to fax over the referral again.

## 2025-02-24 ENCOUNTER — LAB (OUTPATIENT)
Dept: LAB | Facility: HOSPITAL | Age: 45
End: 2025-02-24
Payer: COMMERCIAL

## 2025-02-24 ENCOUNTER — HOSPITAL ENCOUNTER (OUTPATIENT)
Dept: CARDIOLOGY | Facility: HOSPITAL | Age: 45
Discharge: HOME | End: 2025-02-24
Payer: COMMERCIAL

## 2025-02-24 DIAGNOSIS — M48.02 CERVICAL STENOSIS OF SPINE: ICD-10-CM

## 2025-02-24 LAB
ABO GROUP BLD: NORMAL
ANION GAP SERPL CALCULATED.3IONS-SCNC: 11 MMOL/L (ref 5–15)
BASOPHILS # BLD AUTO: 0.02 10*3/MM3 (ref 0–0.2)
BASOPHILS NFR BLD AUTO: 0.3 % (ref 0–1.5)
BILIRUB UR QL STRIP: NEGATIVE
BLD GP AB SCN SERPL QL: NEGATIVE
BUN SERPL-MCNC: 14 MG/DL (ref 6–20)
BUN/CREAT SERPL: 18.2 (ref 7–25)
CALCIUM SPEC-SCNC: 9.5 MG/DL (ref 8.6–10.5)
CHLORIDE SERPL-SCNC: 104 MMOL/L (ref 98–107)
CLARITY UR: ABNORMAL
CO2 SERPL-SCNC: 26 MMOL/L (ref 22–29)
COLOR UR: YELLOW
CREAT SERPL-MCNC: 0.77 MG/DL (ref 0.57–1)
DEPRECATED RDW RBC AUTO: 40 FL (ref 37–54)
EGFRCR SERPLBLD CKD-EPI 2021: 98.3 ML/MIN/1.73
EOSINOPHIL # BLD AUTO: 0.15 10*3/MM3 (ref 0–0.4)
EOSINOPHIL NFR BLD AUTO: 2.4 % (ref 0.3–6.2)
ERYTHROCYTE [DISTWIDTH] IN BLOOD BY AUTOMATED COUNT: 12.5 % (ref 12.3–15.4)
GLUCOSE SERPL-MCNC: 91 MG/DL (ref 65–99)
GLUCOSE UR STRIP-MCNC: NEGATIVE MG/DL
HBA1C MFR BLD: 5.71 % (ref 4.8–5.6)
HCT VFR BLD AUTO: 41.3 % (ref 34–46.6)
HGB BLD-MCNC: 13.3 G/DL (ref 12–15.9)
HGB UR QL STRIP.AUTO: NEGATIVE
IMM GRANULOCYTES # BLD AUTO: 0.01 10*3/MM3 (ref 0–0.05)
IMM GRANULOCYTES NFR BLD AUTO: 0.2 % (ref 0–0.5)
INR PPP: 0.98 (ref 0.9–1.1)
KETONES UR QL STRIP: NEGATIVE
LEUKOCYTE ESTERASE UR QL STRIP.AUTO: ABNORMAL
LYMPHOCYTES # BLD AUTO: 2.36 10*3/MM3 (ref 0.7–3.1)
LYMPHOCYTES NFR BLD AUTO: 38.2 % (ref 19.6–45.3)
MCH RBC QN AUTO: 28.1 PG (ref 26.6–33)
MCHC RBC AUTO-ENTMCNC: 32.2 G/DL (ref 31.5–35.7)
MCV RBC AUTO: 87.1 FL (ref 79–97)
MONOCYTES # BLD AUTO: 0.45 10*3/MM3 (ref 0.1–0.9)
MONOCYTES NFR BLD AUTO: 7.3 % (ref 5–12)
MRSA DNA SPEC QL NAA+PROBE: NORMAL
NEUTROPHILS NFR BLD AUTO: 3.18 10*3/MM3 (ref 1.7–7)
NEUTROPHILS NFR BLD AUTO: 51.6 % (ref 42.7–76)
NITRITE UR QL STRIP: NEGATIVE
NRBC BLD AUTO-RTO: 0 /100 WBC (ref 0–0.2)
PH UR STRIP.AUTO: 6.5 [PH] (ref 5–8)
PLATELET # BLD AUTO: 365 10*3/MM3 (ref 140–450)
PMV BLD AUTO: 10.1 FL (ref 6–12)
POTASSIUM SERPL-SCNC: 4.3 MMOL/L (ref 3.5–5.2)
PROT UR QL STRIP: NEGATIVE
PROTHROMBIN TIME: 12.9 SECONDS (ref 11.7–14.2)
RBC # BLD AUTO: 4.74 10*6/MM3 (ref 3.77–5.28)
RH BLD: POSITIVE
SODIUM SERPL-SCNC: 141 MMOL/L (ref 136–145)
SP GR UR STRIP: 1.02 (ref 1–1.03)
T&S EXPIRATION DATE: NORMAL
UROBILINOGEN UR QL STRIP: ABNORMAL
WBC NRBC COR # BLD AUTO: 6.17 10*3/MM3 (ref 3.4–10.8)

## 2025-02-24 PROCEDURE — 36415 COLL VENOUS BLD VENIPUNCTURE: CPT

## 2025-02-24 PROCEDURE — 86850 RBC ANTIBODY SCREEN: CPT | Performed by: NEUROLOGICAL SURGERY

## 2025-02-24 PROCEDURE — 83036 HEMOGLOBIN GLYCOSYLATED A1C: CPT

## 2025-02-24 PROCEDURE — 87641 MR-STAPH DNA AMP PROBE: CPT

## 2025-02-24 PROCEDURE — 86901 BLOOD TYPING SEROLOGIC RH(D): CPT | Performed by: NEUROLOGICAL SURGERY

## 2025-02-24 PROCEDURE — 93005 ELECTROCARDIOGRAM TRACING: CPT | Performed by: NEUROLOGICAL SURGERY

## 2025-02-24 PROCEDURE — 80048 BASIC METABOLIC PNL TOTAL CA: CPT

## 2025-02-24 PROCEDURE — 81003 URINALYSIS AUTO W/O SCOPE: CPT

## 2025-02-24 PROCEDURE — 85610 PROTHROMBIN TIME: CPT

## 2025-02-24 PROCEDURE — 85025 COMPLETE CBC W/AUTO DIFF WBC: CPT

## 2025-02-24 PROCEDURE — 86901 BLOOD TYPING SEROLOGIC RH(D): CPT

## 2025-02-24 PROCEDURE — 86900 BLOOD TYPING SEROLOGIC ABO: CPT | Performed by: NEUROLOGICAL SURGERY

## 2025-02-24 PROCEDURE — 86900 BLOOD TYPING SEROLOGIC ABO: CPT

## 2025-02-28 RX ORDER — METOPROLOL SUCCINATE 50 MG/1
100 TABLET, EXTENDED RELEASE ORAL DAILY
Qty: 180 TABLET | Refills: 0 | Status: SHIPPED | OUTPATIENT
Start: 2025-02-28

## 2025-02-28 RX ORDER — ESCITALOPRAM OXALATE 20 MG/1
20 TABLET ORAL DAILY
Qty: 90 TABLET | Refills: 0 | Status: SHIPPED | OUTPATIENT
Start: 2025-02-28

## 2025-03-01 LAB
QT INTERVAL: 400 MS
QTC INTERVAL: 470 MS

## 2025-03-04 ENCOUNTER — APPOINTMENT (OUTPATIENT)
Dept: GENERAL RADIOLOGY | Facility: HOSPITAL | Age: 45
DRG: 518 | End: 2025-03-04
Payer: COMMERCIAL

## 2025-03-04 ENCOUNTER — ANESTHESIA (OUTPATIENT)
Dept: PERIOP | Facility: HOSPITAL | Age: 45
End: 2025-03-04
Payer: COMMERCIAL

## 2025-03-04 ENCOUNTER — HOSPITAL ENCOUNTER (INPATIENT)
Facility: HOSPITAL | Age: 45
LOS: 1 days | Discharge: HOME | DRG: 518 | End: 2025-03-04
Attending: NEUROLOGICAL SURGERY | Admitting: NEUROLOGICAL SURGERY
Payer: COMMERCIAL

## 2025-03-04 ENCOUNTER — ANESTHESIA EVENT (OUTPATIENT)
Dept: PERIOP | Facility: HOSPITAL | Age: 45
End: 2025-03-04
Payer: COMMERCIAL

## 2025-03-04 VITALS
BODY MASS INDEX: 37.77 KG/M2 | WEIGHT: 235 LBS | SYSTOLIC BLOOD PRESSURE: 149 MMHG | RESPIRATION RATE: 10 BRPM | OXYGEN SATURATION: 96 % | TEMPERATURE: 98.1 F | DIASTOLIC BLOOD PRESSURE: 97 MMHG | HEART RATE: 65 BPM | HEIGHT: 66 IN

## 2025-03-04 DIAGNOSIS — M48.02 CERVICAL STENOSIS OF SPINE: Primary | ICD-10-CM

## 2025-03-04 LAB — B-HCG UR QL: NEGATIVE

## 2025-03-04 PROCEDURE — 72040 X-RAY EXAM NECK SPINE 2-3 VW: CPT

## 2025-03-04 PROCEDURE — 25010000002 DEXAMETHASONE PER 1 MG: Performed by: NURSE ANESTHETIST, CERTIFIED REGISTERED

## 2025-03-04 PROCEDURE — 25010000002 MIDAZOLAM PER 1 MG: Performed by: NURSE ANESTHETIST, CERTIFIED REGISTERED

## 2025-03-04 PROCEDURE — 25010000002 METHYLPREDNISOLONE PER 40 MG: Performed by: NEUROLOGICAL SURGERY

## 2025-03-04 PROCEDURE — 25810000003 LACTATED RINGERS PER 1000 ML: Performed by: NURSE ANESTHETIST, CERTIFIED REGISTERED

## 2025-03-04 PROCEDURE — 25010000002 CEFAZOLIN PER 500 MG: Performed by: NEUROLOGICAL SURGERY

## 2025-03-04 PROCEDURE — 25010000002 LIDOCAINE 1% - EPINEPHRINE 1:100000 1 %-1:100000 SOLUTION: Performed by: NEUROLOGICAL SURGERY

## 2025-03-04 PROCEDURE — 25810000003 LACTATED RINGERS PER 1000 ML: Performed by: ANESTHESIOLOGY

## 2025-03-04 PROCEDURE — 25010000002 FENTANYL CITRATE (PF) 50 MCG/ML SOLUTION: Performed by: NURSE ANESTHETIST, CERTIFIED REGISTERED

## 2025-03-04 PROCEDURE — 25010000002 MAGNESIUM SULFATE PER 500 MG OF MAGNESIUM: Performed by: NURSE ANESTHETIST, CERTIFIED REGISTERED

## 2025-03-04 PROCEDURE — 22856 TOT DISC ARTHRP 1NTRSPC CRV: CPT | Performed by: NEUROLOGICAL SURGERY

## 2025-03-04 PROCEDURE — 81025 URINE PREGNANCY TEST: CPT | Performed by: ANESTHESIOLOGY

## 2025-03-04 PROCEDURE — 22856 TOT DISC ARTHRP 1NTRSPC CRV: CPT

## 2025-03-04 PROCEDURE — 25010000002 HYDROMORPHONE 1 MG/ML SOLUTION: Performed by: NURSE ANESTHETIST, CERTIFIED REGISTERED

## 2025-03-04 PROCEDURE — 25010000002 ONDANSETRON PER 1 MG: Performed by: NURSE ANESTHETIST, CERTIFIED REGISTERED

## 2025-03-04 PROCEDURE — 25010000002 PROPOFOL 10 MG/ML EMULSION: Performed by: NURSE ANESTHETIST, CERTIFIED REGISTERED

## 2025-03-04 PROCEDURE — 25010000002 SUCCINYLCHOLINE PER 20 MG: Performed by: NURSE ANESTHETIST, CERTIFIED REGISTERED

## 2025-03-04 PROCEDURE — 0RB30ZZ EXCISION OF CERVICAL VERTEBRAL DISC, OPEN APPROACH: ICD-10-PCS | Performed by: NEUROLOGICAL SURGERY

## 2025-03-04 PROCEDURE — C1713 ANCHOR/SCREW BN/BN,TIS/BN: HCPCS | Performed by: NEUROLOGICAL SURGERY

## 2025-03-04 PROCEDURE — 25810000003 SODIUM CHLORIDE 0.9 % SOLUTION: Performed by: NURSE ANESTHETIST, CERTIFIED REGISTERED

## 2025-03-04 PROCEDURE — 99024 POSTOP FOLLOW-UP VISIT: CPT

## 2025-03-04 PROCEDURE — 76000 FLUOROSCOPY <1 HR PHYS/QHP: CPT

## 2025-03-04 PROCEDURE — 0RR30JZ REPLACEMENT OF CERVICAL VERTEBRAL DISC WITH SYNTHETIC SUBSTITUTE, OPEN APPROACH: ICD-10-PCS | Performed by: NEUROLOGICAL SURGERY

## 2025-03-04 PROCEDURE — 25010000002 LIDOCAINE PF 2% 2 % SOLUTION: Performed by: NURSE ANESTHETIST, CERTIFIED REGISTERED

## 2025-03-04 DEVICE — DEV CONTRL TISS STRATAFIX SPIRAL MNCRYL UD 3/0 PLS 30CM: Type: IMPLANTABLE DEVICE | Site: SPINE CERVICAL | Status: FUNCTIONAL

## 2025-03-04 DEVICE — FLOSEAL WITH RECOTHROM - 10ML.
Type: IMPLANTABLE DEVICE | Site: SPINE CERVICAL | Status: FUNCTIONAL
Brand: FLOSEAL HEMOSTATIC MATRIX

## 2025-03-04 DEVICE — SECURE-C ENDPLATE & CORE ASSEMBLY, 14X16, 0&DEG, 6MM
Type: IMPLANTABLE DEVICE | Site: SPINE CERVICAL | Status: FUNCTIONAL
Brand: SECURE-C

## 2025-03-04 RX ORDER — SODIUM CHLORIDE, SODIUM LACTATE, POTASSIUM CHLORIDE, CALCIUM CHLORIDE 600; 310; 30; 20 MG/100ML; MG/100ML; MG/100ML; MG/100ML
1000 INJECTION, SOLUTION INTRAVENOUS CONTINUOUS
Status: DISPENSED | OUTPATIENT
Start: 2025-03-04 | End: 2025-03-04

## 2025-03-04 RX ORDER — LIDOCAINE HYDROCHLORIDE 10 MG/ML
0.5 INJECTION, SOLUTION EPIDURAL; INFILTRATION; INTRACAUDAL; PERINEURAL ONCE AS NEEDED
Status: DISCONTINUED | OUTPATIENT
Start: 2025-03-04 | End: 2025-03-04 | Stop reason: HOSPADM

## 2025-03-04 RX ORDER — FENTANYL CITRATE 50 UG/ML
INJECTION, SOLUTION INTRAMUSCULAR; INTRAVENOUS AS NEEDED
Status: DISCONTINUED | OUTPATIENT
Start: 2025-03-04 | End: 2025-03-04 | Stop reason: SURG

## 2025-03-04 RX ORDER — SODIUM CHLORIDE, SODIUM LACTATE, POTASSIUM CHLORIDE, CALCIUM CHLORIDE 600; 310; 30; 20 MG/100ML; MG/100ML; MG/100ML; MG/100ML
INJECTION, SOLUTION INTRAVENOUS CONTINUOUS PRN
Status: DISCONTINUED | OUTPATIENT
Start: 2025-03-04 | End: 2025-03-04 | Stop reason: SURG

## 2025-03-04 RX ORDER — CYCLOBENZAPRINE HCL 10 MG
10 TABLET ORAL 3 TIMES DAILY PRN
Qty: 15 TABLET | Refills: 0 | Status: SHIPPED | OUTPATIENT
Start: 2025-03-04

## 2025-03-04 RX ORDER — LIDOCAINE HYDROCHLORIDE AND EPINEPHRINE 10; 10 MG/ML; UG/ML
INJECTION, SOLUTION INFILTRATION; PERINEURAL AS NEEDED
Status: DISCONTINUED | OUTPATIENT
Start: 2025-03-04 | End: 2025-03-04 | Stop reason: HOSPADM

## 2025-03-04 RX ORDER — ACETAMINOPHEN 500 MG
1000 TABLET ORAL ONCE
Status: COMPLETED | OUTPATIENT
Start: 2025-03-04 | End: 2025-03-04

## 2025-03-04 RX ORDER — SUCCINYLCHOLINE CHLORIDE 20 MG/ML
INJECTION INTRAMUSCULAR; INTRAVENOUS AS NEEDED
Status: DISCONTINUED | OUTPATIENT
Start: 2025-03-04 | End: 2025-03-04 | Stop reason: SURG

## 2025-03-04 RX ORDER — SODIUM CHLORIDE 9 MG/ML
40 INJECTION, SOLUTION INTRAVENOUS AS NEEDED
Status: DISCONTINUED | OUTPATIENT
Start: 2025-03-04 | End: 2025-03-04 | Stop reason: HOSPADM

## 2025-03-04 RX ORDER — PROPOFOL 10 MG/ML
VIAL (ML) INTRAVENOUS AS NEEDED
Status: DISCONTINUED | OUTPATIENT
Start: 2025-03-04 | End: 2025-03-04 | Stop reason: SURG

## 2025-03-04 RX ORDER — DEXAMETHASONE SODIUM PHOSPHATE 4 MG/ML
INJECTION, SOLUTION INTRA-ARTICULAR; INTRALESIONAL; INTRAMUSCULAR; INTRAVENOUS; SOFT TISSUE AS NEEDED
Status: DISCONTINUED | OUTPATIENT
Start: 2025-03-04 | End: 2025-03-04 | Stop reason: SURG

## 2025-03-04 RX ORDER — FLUMAZENIL 0.1 MG/ML
0.2 INJECTION INTRAVENOUS AS NEEDED
Status: DISCONTINUED | OUTPATIENT
Start: 2025-03-04 | End: 2025-03-04 | Stop reason: HOSPADM

## 2025-03-04 RX ORDER — CELECOXIB 200 MG/1
400 CAPSULE ORAL ONCE
Status: COMPLETED | OUTPATIENT
Start: 2025-03-04 | End: 2025-03-04

## 2025-03-04 RX ORDER — NALOXONE HCL 0.4 MG/ML
0.2 VIAL (ML) INJECTION AS NEEDED
Status: DISCONTINUED | OUTPATIENT
Start: 2025-03-04 | End: 2025-03-04 | Stop reason: HOSPADM

## 2025-03-04 RX ORDER — MAGNESIUM SULFATE HEPTAHYDRATE 500 MG/ML
INJECTION, SOLUTION INTRAMUSCULAR; INTRAVENOUS AS NEEDED
Status: DISCONTINUED | OUTPATIENT
Start: 2025-03-04 | End: 2025-03-04 | Stop reason: SURG

## 2025-03-04 RX ORDER — LABETALOL HYDROCHLORIDE 5 MG/ML
5 INJECTION, SOLUTION INTRAVENOUS
Status: DISCONTINUED | OUTPATIENT
Start: 2025-03-04 | End: 2025-03-04 | Stop reason: HOSPADM

## 2025-03-04 RX ORDER — PROMETHAZINE HYDROCHLORIDE 25 MG/1
25 TABLET ORAL ONCE AS NEEDED
Status: DISCONTINUED | OUTPATIENT
Start: 2025-03-04 | End: 2025-03-04 | Stop reason: HOSPADM

## 2025-03-04 RX ORDER — PROCHLORPERAZINE EDISYLATE 5 MG/ML
10 INJECTION INTRAMUSCULAR; INTRAVENOUS EVERY 6 HOURS PRN
Status: DISCONTINUED | OUTPATIENT
Start: 2025-03-04 | End: 2025-03-04 | Stop reason: HOSPADM

## 2025-03-04 RX ORDER — SODIUM CHLORIDE 0.9 % (FLUSH) 0.9 %
10 SYRINGE (ML) INJECTION AS NEEDED
Status: DISCONTINUED | OUTPATIENT
Start: 2025-03-04 | End: 2025-03-04 | Stop reason: HOSPADM

## 2025-03-04 RX ORDER — FENTANYL CITRATE 50 UG/ML
50 INJECTION, SOLUTION INTRAMUSCULAR; INTRAVENOUS
Status: DISCONTINUED | OUTPATIENT
Start: 2025-03-04 | End: 2025-03-04 | Stop reason: HOSPADM

## 2025-03-04 RX ORDER — IPRATROPIUM BROMIDE AND ALBUTEROL SULFATE 2.5; .5 MG/3ML; MG/3ML
3 SOLUTION RESPIRATORY (INHALATION) ONCE AS NEEDED
Status: DISCONTINUED | OUTPATIENT
Start: 2025-03-04 | End: 2025-03-04 | Stop reason: HOSPADM

## 2025-03-04 RX ORDER — MIDAZOLAM HYDROCHLORIDE 1 MG/ML
INJECTION, SOLUTION INTRAMUSCULAR; INTRAVENOUS AS NEEDED
Status: DISCONTINUED | OUTPATIENT
Start: 2025-03-04 | End: 2025-03-04 | Stop reason: SURG

## 2025-03-04 RX ORDER — PROMETHAZINE HYDROCHLORIDE 25 MG/1
25 SUPPOSITORY RECTAL ONCE AS NEEDED
Status: DISCONTINUED | OUTPATIENT
Start: 2025-03-04 | End: 2025-03-04 | Stop reason: HOSPADM

## 2025-03-04 RX ORDER — SCOPOLAMINE 1 MG/3D
1 PATCH, EXTENDED RELEASE TRANSDERMAL
Status: DISCONTINUED | OUTPATIENT
Start: 2025-03-04 | End: 2025-03-04 | Stop reason: HOSPADM

## 2025-03-04 RX ORDER — HYDROCODONE BITARTRATE AND ACETAMINOPHEN 5; 325 MG/1; MG/1
1 TABLET ORAL ONCE AS NEEDED
Status: COMPLETED | OUTPATIENT
Start: 2025-03-04 | End: 2025-03-04

## 2025-03-04 RX ORDER — DIPHENHYDRAMINE HYDROCHLORIDE 50 MG/ML
12.5 INJECTION INTRAMUSCULAR; INTRAVENOUS
Status: DISCONTINUED | OUTPATIENT
Start: 2025-03-04 | End: 2025-03-04 | Stop reason: HOSPADM

## 2025-03-04 RX ORDER — DEXMEDETOMIDINE HYDROCHLORIDE 100 UG/ML
INJECTION, SOLUTION INTRAVENOUS AS NEEDED
Status: DISCONTINUED | OUTPATIENT
Start: 2025-03-04 | End: 2025-03-04 | Stop reason: SURG

## 2025-03-04 RX ORDER — LIDOCAINE HYDROCHLORIDE 20 MG/ML
INJECTION, SOLUTION EPIDURAL; INFILTRATION; INTRACAUDAL; PERINEURAL AS NEEDED
Status: DISCONTINUED | OUTPATIENT
Start: 2025-03-04 | End: 2025-03-04 | Stop reason: SURG

## 2025-03-04 RX ORDER — ONDANSETRON 2 MG/ML
INJECTION INTRAMUSCULAR; INTRAVENOUS AS NEEDED
Status: DISCONTINUED | OUTPATIENT
Start: 2025-03-04 | End: 2025-03-04 | Stop reason: SURG

## 2025-03-04 RX ORDER — HYDROCODONE BITARTRATE AND ACETAMINOPHEN 5; 325 MG/1; MG/1
1 TABLET ORAL EVERY 6 HOURS PRN
Qty: 20 TABLET | Refills: 0 | Status: SHIPPED | OUTPATIENT
Start: 2025-03-04

## 2025-03-04 RX ORDER — OXYCODONE HCL 10 MG/1
10 TABLET, FILM COATED, EXTENDED RELEASE ORAL ONCE
Status: COMPLETED | OUTPATIENT
Start: 2025-03-04 | End: 2025-03-04

## 2025-03-04 RX ORDER — MELOXICAM 15 MG/1
15 TABLET ORAL DAILY
Qty: 7 TABLET | Refills: 0 | Status: SHIPPED | OUTPATIENT
Start: 2025-03-04

## 2025-03-04 RX ORDER — GABAPENTIN 300 MG/1
600 CAPSULE ORAL ONCE
Status: COMPLETED | OUTPATIENT
Start: 2025-03-04 | End: 2025-03-04

## 2025-03-04 RX ORDER — SODIUM CHLORIDE 0.9 % (FLUSH) 0.9 %
10 SYRINGE (ML) INJECTION EVERY 12 HOURS SCHEDULED
Status: DISCONTINUED | OUTPATIENT
Start: 2025-03-04 | End: 2025-03-04 | Stop reason: HOSPADM

## 2025-03-04 RX ORDER — HYDRALAZINE HYDROCHLORIDE 20 MG/ML
5 INJECTION INTRAMUSCULAR; INTRAVENOUS
Status: DISCONTINUED | OUTPATIENT
Start: 2025-03-04 | End: 2025-03-04 | Stop reason: HOSPADM

## 2025-03-04 RX ORDER — METHYLPREDNISOLONE ACETATE 40 MG/ML
INJECTION, SUSPENSION INTRA-ARTICULAR; INTRALESIONAL; INTRAMUSCULAR; SOFT TISSUE AS NEEDED
Status: DISCONTINUED | OUTPATIENT
Start: 2025-03-04 | End: 2025-03-04 | Stop reason: HOSPADM

## 2025-03-04 RX ORDER — SODIUM CHLORIDE 9 MG/ML
INJECTION, SOLUTION INTRAVENOUS CONTINUOUS PRN
Status: DISCONTINUED | OUTPATIENT
Start: 2025-03-04 | End: 2025-03-04 | Stop reason: SURG

## 2025-03-04 RX ORDER — ONDANSETRON 2 MG/ML
4 INJECTION INTRAMUSCULAR; INTRAVENOUS ONCE AS NEEDED
Status: DISCONTINUED | OUTPATIENT
Start: 2025-03-04 | End: 2025-03-04 | Stop reason: HOSPADM

## 2025-03-04 RX ADMIN — SCOPOLAMINE 1 PATCH: 1.5 PATCH, EXTENDED RELEASE TRANSDERMAL at 09:40

## 2025-03-04 RX ADMIN — MAGNESIUM SULFATE HEPTAHYDRATE 2 G: 500 INJECTION, SOLUTION INTRAMUSCULAR; INTRAVENOUS at 10:14

## 2025-03-04 RX ADMIN — SODIUM CHLORIDE, SODIUM LACTATE, POTASSIUM CHLORIDE, AND CALCIUM CHLORIDE: .6; .31; .03; .02 INJECTION, SOLUTION INTRAVENOUS at 09:56

## 2025-03-04 RX ADMIN — CELECOXIB 400 MG: 200 CAPSULE ORAL at 09:40

## 2025-03-04 RX ADMIN — DEXMEDETOMIDINE HYDROCHLORIDE 10 MCG: 100 INJECTION, SOLUTION INTRAVENOUS at 10:16

## 2025-03-04 RX ADMIN — SUCCINYLCHOLINE CHLORIDE 80 MG: 20 INJECTION, SOLUTION INTRAMUSCULAR; INTRAVENOUS at 10:05

## 2025-03-04 RX ADMIN — DEXMEDETOMIDINE HYDROCHLORIDE 10 MCG: 100 INJECTION, SOLUTION INTRAVENOUS at 10:11

## 2025-03-04 RX ADMIN — FENTANYL CITRATE 50 MCG: 50 INJECTION, SOLUTION INTRAMUSCULAR; INTRAVENOUS at 10:03

## 2025-03-04 RX ADMIN — MIDAZOLAM 2 MG: 1 INJECTION INTRAMUSCULAR; INTRAVENOUS at 09:56

## 2025-03-04 RX ADMIN — HYDROMORPHONE HYDROCHLORIDE 1 MG: 1 INJECTION, SOLUTION INTRAMUSCULAR; INTRAVENOUS; SUBCUTANEOUS at 11:40

## 2025-03-04 RX ADMIN — CEFAZOLIN 2 G: 2 INJECTION, POWDER, FOR SOLUTION INTRAMUSCULAR; INTRAVENOUS at 09:50

## 2025-03-04 RX ADMIN — FENTANYL CITRATE 100 MCG: 50 INJECTION, SOLUTION INTRAMUSCULAR; INTRAVENOUS at 10:16

## 2025-03-04 RX ADMIN — PROPOFOL 300 MCG/KG/MIN: 10 INJECTION, EMULSION INTRAVENOUS at 10:40

## 2025-03-04 RX ADMIN — PROPOFOL 200 MCG/KG/MIN: 10 INJECTION, EMULSION INTRAVENOUS at 10:05

## 2025-03-04 RX ADMIN — DEXAMETHASONE SODIUM PHOSPHATE 4 MG: 4 INJECTION, SOLUTION INTRAMUSCULAR; INTRAVENOUS at 10:10

## 2025-03-04 RX ADMIN — ONDANSETRON 4 MG: 2 INJECTION, SOLUTION INTRAMUSCULAR; INTRAVENOUS at 10:09

## 2025-03-04 RX ADMIN — SODIUM CHLORIDE: 9 INJECTION, SOLUTION INTRAVENOUS at 10:09

## 2025-03-04 RX ADMIN — FENTANYL CITRATE 50 MCG: 50 INJECTION, SOLUTION INTRAMUSCULAR; INTRAVENOUS at 10:09

## 2025-03-04 RX ADMIN — GABAPENTIN 600 MG: 300 CAPSULE ORAL at 09:39

## 2025-03-04 RX ADMIN — SODIUM CHLORIDE, SODIUM LACTATE, POTASSIUM CHLORIDE, AND CALCIUM CHLORIDE 1000 ML: 600; 310; 30; 20 INJECTION, SOLUTION INTRAVENOUS at 08:18

## 2025-03-04 RX ADMIN — ACETAMINOPHEN 1000 MG: 500 TABLET, FILM COATED ORAL at 09:39

## 2025-03-04 RX ADMIN — PROPOFOL 300 MCG/KG/MIN: 10 INJECTION, EMULSION INTRAVENOUS at 11:01

## 2025-03-04 RX ADMIN — REMIFENTANIL HYDROCHLORIDE 0.1 MCG/KG/MIN: 1 INJECTION, POWDER, LYOPHILIZED, FOR SOLUTION INTRAVENOUS at 10:05

## 2025-03-04 RX ADMIN — HYDROCODONE BITARTRATE AND ACETAMINOPHEN 1 TABLET: 5; 325 TABLET ORAL at 13:05

## 2025-03-04 RX ADMIN — LIDOCAINE HYDROCHLORIDE 50 MG: 20 INJECTION, SOLUTION EPIDURAL; INFILTRATION; INTRACAUDAL; PERINEURAL at 10:05

## 2025-03-04 RX ADMIN — OXYCODONE HYDROCHLORIDE 10 MG: 10 TABLET, FILM COATED, EXTENDED RELEASE ORAL at 09:40

## 2025-03-04 NOTE — DISCHARGE INSTRUCTIONS
Cervical Arthroplasty  (Artificial Disc)    Post-op Guide    Dr. Mo Correa MD          Medication instructions: You have been prescribed a 7-day course of an anti-inflammatory medication called meloxicam (Mobic).  Take this medication once per day until the full course is gone.  This is to reduce inflammation at the surgical site and should be taken regardless of your pain level.  Call the office with any questions or concerns.          WOUND CARE INSTRUCTIONS AFTER SURGERY    Keep incision covered with sterile dressing at all times for 48 hours after surgery.     You may shower 48 hours after surgery.  Keep the incision covered in the shower with watertight dressing for 2 weeks after surgery. When not in the shower you may remove the dressing and keep incision open to air.      Do not submerge your incision under water, to include hot pools, tubs, pools, lakes, and oceans x6 weeks after surgery.     Don't be alarmed if you experience some of your pre-operative symptoms after going home. This is not uncommon and normally goes away in a few days but may last longer. Pain, aching and stiffness in your neck, back, and down your legs is common.     If you have any questions or concerns don't hesitate to call the office.          Surgery:    Cervical arthroplasty, or replacement of a disc in the neck with an artificial disc, is an alternative to fusion.  The cervical spine is composed of bones called “vertebrae” that are stacked on top of one another.   the vertebrae from each other is a “intervertebral disc,” which is made up of soft tissue.  Arthritis, trauma/accidents and simply wear and tear over years can cause the soft inner portion of the disc to “herniate” through the tough, fibrous outer portion of the disc.  The herniated disc can put pressure on the spinal nerves and spinal cord, which can result in pain, numbness, tingling and weakness in the arms and legs.  Disc degeneration can  also cause “discogenic” neck pain, which may also be treated with disc replacement.  Replacing the abnormal disc with an artificial disc offers an alternative to fusion.  In fusion, the disc is replaced by bone and does not move.  Placement of an artificial disc allows for normal movement.  Artificial discs work well for people that have a herniated disc/discogenic pain, but do not have severe degeneration of the disc or the joints associated with it.  An artificial disc can be used to replace one or two abnormal discs.    There are two major advantages to the artificial cervical disc:     1. The artificial disc moves like a normal disc, and you do not lose your normal        range of motion.      2. When the neck is fused, the normal biomechanics of the bones and joints are        altered.  More mechanical pressure is placed on the discs and joints above       and below the fusion, which can accelerate arthritis and wear and tear.  This is          called “adjacent segment disease,” and may result in compression of the nerves/spinal    cord and cause pain.  Adjacent segment disease often requires more surgery to correct.  Placement of an artificial disc maintains the normal biomechanics of the back, and substantially decreases the risk of adjacent level disease and future surgery.                                            Post Op   ACTIVITY GUIDE     DAY OF SURGERY   We want you to get up and Move!    Getting out of bed soon after surgery will speed your recovery!    GOAL:  Out of bed a few hours after awaking from surgery for your first walk  You may require assistance from the nurse  A walker for stability may be used depending on your pre-op abilities  Short frequent walks (5min) every 2 hours while in hospital  Engage core when getting in and out of bed   Use smart movement strategies when changing positions  Practice DEEP BREATHING while you walk  3-6 count inhale and exhale  Rely on ORAL pain medications NOT  IV       ADVANTAGES:  Prevents blood clots in the legs  Prevents pneumonia through promoting deep breathing  Prevents muscles from stiffening - will decrease pain   You CANNOT walk too much  Oral pain meds have better steady control of pain     POST-OP    Diet / Activity / Pain Control / GO HOME    Assessments by Physical Therapy and Occupational Therapy (OT)    GOAL  Review proper neck mechanics and restrictions (ok to move neck, but avoid extreme flexion or extension)  Walking up and down stairs is GOOD as long as you do it safely  PT / OT or Recovery unit nurse will assess when you are safe to go home  Activities of Daily Living - okay to shower, dress, navigate around house  Surgical incision needs to be covered in the shower unless advised otherwise by Dr. Correa  No baths, hot tubs, swimming pools for 6 weeks or until incision closed without scab.   Go home !          Criteria for Discharge Home:  Cleared by PT / OT or Recovery room nurse  Adequate pain control with or without medications  Tolerating food and Liquids  Ability to urinate  Having a bowel movement IS NOT a discharge requirement  Depends on pain control, support at home, mobility    Occasionally, some patients with extra care needs may require days in the hospital or a short stay at a local rehabilitation facility (e.g. patients with minimal home social support, additional medical needs). Hospital based  will assist with rehab placement.     ACTIVITY GUIDELINES     Driving    - Okay to consider during first 2 weeks after surgery   - MUST meet following requirements    -You must be OFF narcotics and not under their influence     - You must be a SAFE  yourself.     - Patients may be considered to be UNSAFE if they are:     -Distracted by their pain     -Unable to sit comfortably for the required length of the      journey                         -Unable to use mirrors safely because of restrictions or              surgical  pain      ACTIVITY - FIRST 2 WEEKS:    Low Impact Aerobic Exercise    5-30min 2 times per day EVERY DAY  Walking, elliptical, stairs and/or recumbent bike  Slow safe pace, okay to do intervals (walk 4 min rest 1 min x 3 -5 sets)  No hiking, speed walking or carrying baggage.   FOCUS ON POSTURE, DEEP BREATHING (6 count) AND CORE ACTIVATION    Physical Therapy  Will start after your first post-operative office visit (2 weeks)  It's Important, So, YES it's Mandatory  2 times per week x 12 weeks  We can recommend a Physical Therapist near your home  They should help guide your core exercise program and training on proper bending and lifting techniques as well as work on proper neck movement   Home exercises and low impact aerobic work  should be done 4-5x per week in addition to PT       RETURNING TO WORK     The timescale for Return to Work will vary from patient-to-patient, and depends mainly on the nature of your specific surgery and the type of work / activity you wish to re-commence.     General Guidelines:    Can work from home if needed within the first week or so of surgery  Do not work for longer than 30-45 minutes at a time without a break (standing and walking around)    Sedentary jobs (desk work etc)   Typically within 2 days - 2 weeks  depends on particulars of job:  driving distance, stress, etc  If you have the option, sometimes returning to work alternate days (i.e Mon-Wed-Fri) for 1-2 weeks assists with the transition back to work.     Manual Labor   Minimum 6 weeks after surgery  Give your self amble time to recover, and work with PT  After 6 weeks, most patients will have no activity limitations    RECREATIONAL SPORTS & ACTIVITIES     After 2 weeks  Biking, light hiking, resistance training, body weight interval training, golf, tennis  Start slow and build up slowly   Do NOT go back FULL SPEED right away  After 6 weeks   Slowly ramp up to full speed during the 2 week to 6 week post-op period  Skiing,  horseback riding, ATV riding, snow mobile etc    Sports - Non-Contact  Minimum 6 weeks  Must have completed aerobic and resistance training   Must have successfully done sports specific drill and been asymptomatic    Sports - Contact   Minimum 12 weeks  Must have completed non-contact  sports specific drills without any symptoms      Lifelong recommendations:  Warm up before EVERY activity 5-10 minutes using Recumbent bike, Stair Master, elliptical , speed walk  Core exercises:   3 -5 x /week - forever!  10 minutes  Should follow warm up prior to activity / sport  Static Core Exercise incorporated into all work-outs

## 2025-03-04 NOTE — ANESTHESIA PROCEDURE NOTES
Airway  Urgency: elective    Date/Time: 3/4/2025 10:06 AM  Airway not difficult    General Information and Staff    Patient location during procedure: OR    Indications and Patient Condition  Indications for airway management: airway protection    Preoxygenated: yes  MILS maintained throughout  Mask difficulty assessment: 1 - vent by mask    Final Airway Details  Final airway type: endotracheal airway      Successful airway: ETT  Cuffed: yes   Successful intubation technique: video laryngoscopy  Facilitating devices/methods: intubating stylet  Endotracheal tube insertion site: oral  Blade: Bowling  ETT size (mm): 7.0  Cormack-Lehane Classification: grade I - full view of glottis  Placement verified by: chest auscultation and capnometry   Cuff volume (mL): 8  Measured from: lips  ETT/EBT  to lips (cm): 21  Number of attempts at approach: 1  Assessment: lips, teeth, and gum same as pre-op and atraumatic intubation

## 2025-03-04 NOTE — ANESTHESIA PREPROCEDURE EVALUATION
Anesthesia Evaluation     Patient summary reviewed and Nursing notes reviewed   history of anesthetic complications:  PONV  NPO Solid Status: > 8 hours  NPO Liquid Status: > 2 hours           Airway   Mallampati: II  TM distance: >3 FB  Neck ROM: full  No difficulty expected  Dental - normal exam     Pulmonary    Cardiovascular     (+) hypertension, hyperlipidemia      Neuro/Psych  (+) headaches, psychiatric history  GI/Hepatic/Renal/Endo    (+) morbid obesity    Musculoskeletal     Abdominal    Substance History      OB/GYN          Other   arthritis,                 Anesthesia Plan    ASA 3     general and ERAS Protocol   total IV anesthesia  intravenous induction     Anesthetic plan, risks, benefits, and alternatives have been provided, discussed and informed consent has been obtained with: patient.    Plan discussed with CRNA.    CODE STATUS:

## 2025-03-04 NOTE — OP NOTE
ANTERIOR CERVICAL ARTHROPLASTY WITH / WITHOUT FUSION  Procedure Report    Patient Name:  Smitha Bruno  YOB: 1980    Date of Surgery:  3/4/2025     Indications: 44-year-old female who was injured at work and developed severe left upper extremity radiculopathy refractory conservative measures.  Imaging demonstrated severe foraminal stenosis at C6-7 on the left back correlated well with the patient's symptoms.  Given failure of all conservative measures patient was taken to the OR for C6-7 anterior cervical discectomy and placement of artificial disc.  Patient understood the risks of surgery including bleeding infection CSF leak nerve damage spinal cord injury hardware failure injury to neck structure including esophagus trachea recurrent laryngeal nerve permanent hoarseness and/or dysphagia need for future operation among many other risks and she agreed to undergo the procedure    Pre-op Diagnosis:   Cervical stenosis of spine [M48.02]  Cervical radiculopathy    Post-Op Diagnosis Codes:     * Cervical stenosis of spine [M48.02]  Cervical radiculopathy    Procedure(s):  Cervical 6 7 discectomy and placement of artificial disc    Staff:  Surgeon(s):  Mo Correa IV, MD    Assistant: Shiva Romeo PA    Anesthesia: General    Estimated Blood Loss:  75cc    Implants:    Implant Name Type Inv. Item Serial No.  Lot No. LRB No. Used Action   DEV CONTRL TISS STRATAFIX SPIRAL MNCRYL UD 3/0 PLS 30CM - [id number] Implant DEV CONTRL TISS STRATAFIX SPIRAL MNCRYL UD 3/0 PLS 30CM  [employer]  DIV OF J AND J [id number] N/A 1 Implanted   KT SEAL HEMOS ABS FLOSEAL MATRX 1.5/FAST/PREP 5000/IU 10ML - [id number] Implant KT SEAL HEMOS ABS FLOSEAL MATRX 1.5/FAST/PREP 5000/IU 10ML  [employer] [facility] [id number] N/A 1 Implanted   KT SEAL HEMOS ABS FLOSEAL MATRX 1.5/FAST/PREP 5000/IU 10ML - [id number] Implant KT SEAL HEMOS ABS FLOSEAL MATRX 1.5/FAST/PREP 5000/IU 10ML  [employer] [id number] N/A 1  Implanted   ENDPLT DISC CERV SECURE/C CORE/ASMBL COCR 0DEG [id number] - [id number] Implant ENDPLT DISC CERV SECURE/C CORE/ASMBL COCR 0DEG [id number]  [employer] [id number] N/A 1 Implanted       Specimen:          None        Findings: None    Complications: None    Description of Procedure: Patient was brought to the OR placed under general endotracheal anesthesia.  Patient was placed supine on a OR table with cervical spine positioning device with her neck in a neutral position and prepped and draped in the usual fashion.  AP and lateral fluoroscopy were used to plan an incision over the C6-7 disc space.  Incision was made and carried down to the deep dermal and fat layers with monopolar cautery.  Platysma was  from underlying neck structures and cut sharply with Metzenbaum scissors.  Planes the neck were followed down to the anterior cervical spine.  The C6-7 disc space was identified on lateral fluoroscopy and the longus coli muscles were released from C6-C7.  Shadow line retractor system was placed.  [place] pins were placed in the C6 and C7 vertebral bodies under fluoroscopic guidance and [place] retractor was placed over the pins and opened this opening the disc space.  Curettes were used to remove soft disc and cartilaginous endplate which was removed from the disc base with pituitary rongeurs.  Microscope was brought in the field.  Remaining cartilaginous endplate was removed with curette and high-speed drill.  High-speed drill was used to remove posterior osteophyte.  Posterior longitudinal ligament was opened and removed with 2 Kerrison.  Foraminotomies were performed bilaterally with special attention paid to the left foramen in order to adequately decompress the nerve root.  Area was thoroughly irrigated and hemostasis was achieved with bipolar cautery and Gelfoam powder.  Globus secure C artificial disc was then placed.  A trial was placed in the disc space under fluoroscopic guidance.  Keel cuts  were made with broaches under fluoroscopic guidance.  Trial was removed and a globus Ikari C artificial disc was done passed into the disc space under fluoroscopic guidance.  Retractor systems were removed and hemostasis was achieved with bipolar cautery and Gelfoam powder.  Wound was thoroughly irrigated.  Final x-rays demonstrated good positioning of all hardware.  Platysma was closed with 3-0 Vicryl sutures, the deep dermal layer with 3-0 Vicryl sutures and the skin was closed with a running subcuticular Monocryl.  Dermabond was placed over the wound.                  Assistant: Shiva Romeo PA  was responsible for performing the following activities: Retraction, Suction, Irrigation, Suturing, Closing, and Placing Dressing and their skilled assistance was necessary for the success of this case.    Mo Correa IV, MD     Date: 3/4/2025  Time: 11:51 EST

## 2025-03-04 NOTE — PROGRESS NOTES
Subjective     Chief Complaint   Patient presents with    Post-op         Previous Treatment: cervical 6 7 discectomy and placement of artificial disc 3/4/25      HPI: Smitha Bruno is a 44 y.o. female who presents to clinic for her 2-week postoperative appointment.  Patient states that she is doing very well overall and has no symptoms since surgery.  Patient reports no neck pain, fevers, chills, drainage, pain, numb/tingling or weakness in her upper extremities.  Patient states that she feels great overall.  Denies any bowel or bladder incontinence or saddle anesthesia at this time        PMH:  Past Medical History:   Diagnosis Date    Allergic 1985    Anxiety 2012    Brain concussion 1998    Headache     Hyperlipidemia     Hypertension     PONV (postoperative nausea and vomiting)          Current Outpatient Medications:     escitalopram (LEXAPRO) 20 MG tablet, Take 1 tablet by mouth once daily, Disp: 90 tablet, Rfl: 0    FEXOFENADINE HCL PO, Take  by mouth., Disp: , Rfl:     gabapentin (NEURONTIN) 300 MG capsule, TAKE 1 CAPSULE BY MOUTH THREE TIMES DAILY. START NIGHTLY AND TITRATE UP TO RECOMMENDED DOSAGE (Patient taking differently: Take 1 capsule by mouth 3 (Three) Times a Day.), Disp: 90 capsule, Rfl: 0    HYDROcodone-acetaminophen (NORCO) 5-325 MG per tablet, Take 1 tablet by mouth Every 6 (Six) Hours As Needed for Severe Pain., Disp: 20 tablet, Rfl: 0    levocetirizine (XYZAL) 5 MG tablet, , Disp: , Rfl:     losartan (Cozaar) 100 MG tablet, Take 1 tablet by mouth Daily. For bp, Disp: 90 tablet, Rfl: 1    meloxicam (MOBIC) 15 MG tablet, Take 1 tablet by mouth Daily. Complete full 7-day course regardless of pain levels., Disp: 7 tablet, Rfl: 0    metoprolol succinate XL (TOPROL-XL) 50 MG 24 hr tablet, Take 2 tablets by mouth once daily, Disp: 180 tablet, Rfl: 0    cyclobenzaprine (FLEXERIL) 10 MG tablet, Take 1 tablet by mouth 3 (Three) Times a Day As Needed for Muscle Spasms., Disp: 30 tablet, Rfl: 0     No  "Known Allergies     Past Surgical History:   Procedure Laterality Date    APPENDECTOMY  2000    CERVICAL DISC ARTHROPLASTY N/A 3/4/2025    Procedure: Cervical 6 7 discectomy and placement of artificial disc;  Surgeon: Mo Correa IV, MD;  Location: [facility] MAIN OR;  Service: Neurosurgery;  Laterality: N/A;        Family History   Problem Relation Age of Onset    Asthma Mother     Diabetes Mother     Early death Mother         age 46    Heart disease Mother     Hyperlipidemia Mother     Miscarriages / Stillbirths Mother     COPD Father     Diabetes Father     Heart disease Father     Hyperlipidemia Father     Stroke Father     Heart disease Brother         MI at 50    Stroke Brother          Social Hx:  Social History     Tobacco Use   Smoking Status Never    Passive exposure: Past   Smokeless Tobacco Never      Alcohol Use: Not on file      Social History     Substance and Sexual Activity   Drug Use Never          Review of Systems   Constitutional:  Positive for activity change.   HENT: Negative.     Eyes: Negative.    Respiratory: Negative.     Cardiovascular: Negative.    Gastrointestinal: Negative.    Endocrine: Negative.    Genitourinary: Negative.    Musculoskeletal:  Positive for arthralgias, myalgias, neck pain and neck stiffness.   Skin:  Positive for color change (red).        Puffy    Neurological: Negative.    Hematological: Negative.    Psychiatric/Behavioral: Negative.           Objective     BP (!) 163/117   Pulse 100   Resp 18   Ht 167.6 cm (66\")   Wt 108 kg (238 lb)   LMP 02/19/2025 (Approximate)   SpO2 98%   BMI 38.41 kg/m²    Body mass index is 38.41 kg/m².      Physical Exam  Vitals reviewed.   Eyes:      Extraocular Movements: Extraocular movements intact.      Conjunctiva/sclera: Conjunctivae normal.      Pupils: Pupils are equal, round, and reactive to light.   Musculoskeletal:         General: Normal range of motion.      Cervical back: Normal range of motion.   Skin:     General: Skin " is warm and dry.   Neurological:      General: No focal deficit present.   Psychiatric:         Mood and Affect: Mood normal.         Speech: Speech normal.          Neurological Exam  Mental Status  Awake, alert and oriented to person, place and time. Oriented to person, place and time. Speech is normal. Language is fluent with no aphasia.    Cranial Nerves  CN III, IV, VI: Extraocular movements intact bilaterally. Pupils equal round and reactive to light bilaterally.    Motor  Normal muscle bulk throughout.                                               Right                     Left  Rhomboids                            5                          5  Infraspinatus                          5                          5  Supraspinatus                       5                          5  Deltoid                                   5                          5   Biceps                                   5                          5  Brachioradialis                      5                          5   Triceps                                  5                          5   Wrist flexor                            5                          5   Wrist extensor                       5                          5    Sensory  Sensation is intact to light touch, pinprick, vibration and proprioception in all four extremities.    Reflexes    Right pathological reflexes: Dona's absent.  Left pathological reflexes: Dona's absent.    Gait    Patient walks without difficulty.          Results Review  I personally reviewed and interpreted the images from the following studies:          XR Spine Cervical 2 or 3 View  Result Date: 3/4/2025  This procedure was auto-finalized with no dictation required.        Assessment & Plan     MDM: Smitha Bruno is a 44 y.o. female who reports to clinic for 2-week postoperative appointment.  Patient states overall she is doing very well and has no new symptoms.    On physical examination patient  has great strength in her upper extremities.  She has great strength, full sensation and I did not appreciate a Dona sign    At this time patient states that she has stopped taking her pain medication and she can now drive.  I told her that she cannot get her incision wet in the shower but she cannot submerge her incision.  She should continue to be cognizant of turning and twisting her neck in addition to lifting 10 pounds.  She is to start physical therapy and to get a CT scan prior to her 3-month follow-up.  She does have some irritation surrounding her incision due to that the adhesive tape, but her incision is healing very nicely.    I told the patient that should she have any new or worsening symptoms to call us right away.  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  I will give her any note if needed.    I refilled her Flexeril during this visit.    If patient has any questions or concerns she knows to call the office and is agreeable with the above plan.       Diagnosis Plan   1. Status post cervical spinal fusion  Ambulatory Referral to Physical Therapy for Evaluation & Treatment    CT Cervical Spine Without Contrast          Return 3-month follow-up, for Next scheduled follow up.      Smitha Bruno  reports that she has never smoked. She has been exposed to tobacco smoke. She has never used smokeless tobacco.                  This patient was examined wearing appropriate personal protective equipment.            Cait Bo PA-C    03/18/25  09:39 EDT      Part of this note may be an electronic transcription/translation of spoken language to printed text using the Dragon Dictation System.

## 2025-03-04 NOTE — DISCHARGE SUMMARY
Discharge Summary    Patient: Smitha Bruno  : 1980    Patient Care Team:  Lashawn Chen MD as PCP - Kathryn Evangelista RN as Ambulatory  ([facility])    Date of Admit: 3/4/2025    Date of Discharge:  3/4/2025    Discharge Diagnosis:  Cervical stenosis of spine      Procedures Performed  Procedure(s):  Cervical 6 7 discectomy and placement of artificial disc       Complications: None    Consultants:   Consults       No orders found from 2/3/2025 to 3/5/2025.            Condition on Discharge: stable    Discharge disposition: home    HPI: Smitha Bruno is a 44 y.o. female who was injured at work 9 months ago with significant neck pain and left-sided radicular symptoms referable to her disc herniation at C6-7.  Patient failed all conservative measures and was taken to the OR for the above procedure with Dr. Correa on 3/4/2025.    Hospital Course: Patient underwent surgery as scheduled.  They were transferred to PACU after their procedure.  Patient was observed in recovery until discharge criteria were met at which point they were discharged home to self-care.    Vitals:    25 0825   BP: (!) 180/109   Pulse: 68   Resp: 16   Temp: 98.7 °F (37.1 °C)   SpO2: 98%         Lab Results (last 24 hours)       Procedure Component Value Units Date/Time    Pregnancy, Urine - Urine, Clean Catch [[id number]]  (Normal) Collected: 25 0758    Specimen: Urine, Clean Catch Updated: 25 0804     HCG, Urine QL Negative            XR Spine Cervical 2 or 3 View    Result Date: 3/4/2025  This procedure was auto-finalized with no dictation required.                   Discharge Physical Exam:    General  - WD/WN female, appears their stated age, awake, cooperative, in no acute distress  HEENT  - Normocephalic, atraumatic, PERRLA, EOM intact  Respiratory  - Normal respiratory rate and effort  Abdomen  - Flat, soft, NT/ND  Musculoskeletal  - Moves all extremities well, no joint  swelling/tenderness  Skin  - Surgical incision well approximated, clean and dry, no swelling, redness, or drainage  NEUROLOGIC  - A/O x3  - CN II-XII grossly intact  - Moves all extremities symmetrically and with good strength  - Sensation intact throughout      Discharge Medications  Inspect has been reviewed and narcotic consent is on file in the patient's chart.     Your medication list        START taking these medications        Instructions Last Dose Given Next Dose Due   cyclobenzaprine 10 MG tablet  Commonly known as: FLEXERIL      Take 1 tablet by mouth 3 (Three) Times a Day As Needed for Muscle Spasms.       HYDROcodone-acetaminophen 5-325 MG per tablet  Commonly known as: NORCO      Take 1 tablet by mouth Every 6 (Six) Hours As Needed for Severe Pain.       meloxicam 15 MG tablet  Commonly known as: MOBIC      Take 1 tablet by mouth Daily. Complete full 7-day course regardless of pain levels.              CHANGE how you take these medications        Instructions Last Dose Given Next Dose Due   gabapentin 300 MG capsule  Commonly known as: NEURONTIN  What changed: See the new instructions.      TAKE 1 CAPSULE BY MOUTH THREE TIMES DAILY. START NIGHTLY AND TITRATE UP TO RECOMMENDED DOSAGE       losartan 100 MG tablet  Commonly known as: Cozaar  What changed: when to take this      Take 0.5 tablets by mouth Daily. For bp              CONTINUE taking these medications        Instructions Last Dose Given Next Dose Due   escitalopram 20 MG tablet  Commonly known as: LEXAPRO      Take 1 tablet by mouth once daily       FEXOFENADINE HCL PO      Take  by mouth.       levocetirizine 5 MG tablet  Commonly known as: XYZAL           metoprolol succinate XL 50 MG 24 hr tablet  Commonly known as: TOPROL-XL      Take 2 tablets by mouth once daily                 Where to Get Your Medications        These medications were sent to [facility] [address] [place] [address]      Hours: Monday to  Friday 7 AM to 7 PM Phone: [phone]   cyclobenzaprine 10 MG tablet  HYDROcodone-acetaminophen 5-325 MG per tablet  meloxicam 15 MG tablet         Discharge Diet: Regular, advance as tolerated      Activity at Discharge: No rapid bending or twisting of the neck.  No lifting greater than 5 pounds.  No driving for 1 week.  Do not soak or submerge incision underwater for 6 weeks.      Call for: questions or concerns    Follow-up Appointments  Future Appointments   Date Time Provider Department Center   3/18/2025  9:00 AM Cait Bo PA-C [facility]      Follow-up Information       Cait Bo PA-C. Go on 3/18/2025.    Specialty: Neurosurgery  Contact information:  [address]  [place] [address] [phone]               Lashawn Chen MD .    Specialty: Family Medicine  Contact information:  [address]  [place] [address] [phone]                               I discussed the discharge instructions with patient    ALEC Slater  03/04/25  11:57 EST            Part of this note may be an electronic transcription/translation of spoken language to printed text using the Dragon Dictation System.

## 2025-03-04 NOTE — ANESTHESIA POSTPROCEDURE EVALUATION
Patient: Smitha Bruno    Procedure Summary       Date: 03/04/25 Room / Location: [facility] OR 02 / [facility] MAIN OR    Anesthesia Start: 0956 Anesthesia Stop: 1159    Procedure: Cervical 6 7 discectomy and placement of artificial disc (Spine Cervical) Diagnosis:       Cervical stenosis of spine      (Cervical stenosis of spine [M48.02])    Surgeons: Mo Correa IV, MD Provider: Dmitry Mendoza MD    Anesthesia Type: general, ERAS Protocol ASA Status: 3            Anesthesia Type: general, ERAS Protocol    Vitals  Vitals Value Taken Time   /90 03/04/25 1318   Temp 98.1 °F (36.7 °C) 03/04/25 1318   Pulse 67 03/04/25 1319   Resp 11 03/04/25 1318   SpO2 96 % 03/04/25 1319   Vitals shown include unfiled device data.        Post Anesthesia Care and Evaluation    Patient location during evaluation: PACU  Patient participation: complete - patient participated  Level of consciousness: awake  Pain scale: See nurse's notes for pain score.  Pain management: adequate    Airway patency: patent  Anesthetic complications: No anesthetic complications  PONV Status: none  Cardiovascular status: acceptable  Respiratory status: acceptable and spontaneous ventilation  Hydration status: acceptable    Comments: Patient seen and examined postoperatively; vital signs stable; SpO2 greater than or equal to 90%; cardiopulmonary status stable; nausea/vomiting adequately controlled; pain adequately controlled; no apparent anesthesia complications; patient discharged from anesthesia care when discharge criteria were met

## 2025-03-04 NOTE — H&P
History of Present Illness: Smitha Bruno is a 44 y.o. female who was injured at work about 9 months ago and developed significant neck pain as well as pain radiating down her left upper extremity.  She describes the pain as radiating from her neck and into her shoulder sometimes down into her forearm with tingling and numbness of her first second and third fingers.  She also complains of some weakness of the triceps on the left she has undergone 2 epidural injections neither of which provided her with lasting relief and she is also done physical therapy without lasting relief.  She has tried over-the-counter and prescription pain medication without improvement.  No changes since she was last seen         Chief Complaint   Patient presents with    Follow-up    Neck Pain            Previous treatment: Physical therapy, over-the-counter prescription pain medication     Previous neurosurgery:       Previous injections: 2 cervical DEISY     The following portions of the patient's history were reviewed and updated as appropriate: allergies, current medications, past family history, past medical history, past social history, past surgical history, and problem list.     Review of Systems   Constitutional:  Positive for activity change.   Musculoskeletal:  Positive for neck pain.   Neurological:  Positive for numbness.            Objective    Neurological Exam  Mental Status  Awake, alert and oriented to person, place and time.     Motor   Strength is 5/5 in all four extremities except as noted.  Left tricep 4+ out of 5.     Sensory  Sensory loss over C6 and C7 dermatomes on the left.     Reflexes     Right pathological reflexes: Dona's absent.  Left pathological reflexes: Dona's absent.                 Assessment & Plan    Medical Decision Making:       Smitha Bruno is a 44 y.o. female who was injured at work 9 months ago with significant neck pain and left-sided radicular symptoms referable to her disc  herniation at C6-7.  Patient has failed all conservative measures would benefit from surgical intervention.  We will proceed with C6-7 discectomy and placement of artificial disc.  Patient understands the risks of surgery as well as increased risk due to medical comorbidities including morbid obesity hypertension hyperlipidemia among other medical comorbidities and she is agreed to undergo the procedure

## 2025-03-05 ENCOUNTER — TRANSITIONAL CARE MANAGEMENT TELEPHONE ENCOUNTER (OUTPATIENT)
Dept: CALL CENTER | Facility: HOSPITAL | Age: 45
End: 2025-03-05
Payer: COMMERCIAL

## 2025-03-05 ENCOUNTER — PATIENT MESSAGE (OUTPATIENT)
Dept: FAMILY MEDICINE CLINIC | Facility: CLINIC | Age: 45
End: 2025-03-05
Payer: COMMERCIAL

## 2025-03-05 ENCOUNTER — READMISSION MANAGEMENT (OUTPATIENT)
Dept: CALL CENTER | Facility: HOSPITAL | Age: 45
End: 2025-03-05
Payer: COMMERCIAL

## 2025-03-05 DIAGNOSIS — I10 PRIMARY HYPERTENSION: ICD-10-CM

## 2025-03-05 NOTE — OUTREACH NOTE
Call Center TCM Note      Flowsheet Row Responses   [employer] facility patient discharged from? [facility]   Does the patient have one of the following disease processes/diagnoses(primary or secondary)? General Surgery   TCM attempt successful? No   Unsuccessful attempts Attempt 1            Kathleen Rubio RN    3/5/2025, 14:43 EST

## 2025-03-05 NOTE — OUTREACH NOTE
Call Center TCM Note      Flowsheet Row Responses   [employer] facility patient discharged from? [facility]   Does the patient have one of the following disease processes/diagnoses(primary or secondary)? General Surgery   TCM attempt successful? No   Unsuccessful attempts Attempt 2            Kathleen Rubio RN    3/5/2025, 16:01 EST

## 2025-03-05 NOTE — OUTREACH NOTE
Prep Survey      Flowsheet Row Responses   [facility] patient discharged from? [facility]   Is LACE score < 7 ? Yes   Eligibility [facility]   Date of Admission 03/04/25   Date of Discharge 03/04/25   Discharge Disposition Home or Self Care   Discharge diagnosis Cervical 6 7 discectomy and placement of artificial disc   Does the patient have one of the following disease processes/diagnoses(primary or secondary)? General Surgery   Does the patient have Home health ordered? No   Is there a DME ordered? No   Prep survey completed? Yes            Lora HUTCHINSON - Registered Nurse

## 2025-03-06 ENCOUNTER — TRANSITIONAL CARE MANAGEMENT TELEPHONE ENCOUNTER (OUTPATIENT)
Dept: CALL CENTER | Facility: HOSPITAL | Age: 45
End: 2025-03-06
Payer: COMMERCIAL

## 2025-03-06 RX ORDER — LOSARTAN POTASSIUM 100 MG/1
100 TABLET ORAL DAILY
Qty: 90 TABLET | Refills: 1 | Status: SHIPPED | OUTPATIENT
Start: 2025-03-06

## 2025-03-06 NOTE — OUTREACH NOTE
Call Center TCM Note      Flowsheet Row Responses   [employer] facility patient discharged from? [facility]   Does the patient have one of the following disease processes/diagnoses(primary or secondary)? General Surgery   TCM attempt successful? No   Unsuccessful attempts Attempt 3            Tiffany Robert RN    3/6/2025, 12:29 EST

## 2025-03-07 ENCOUNTER — TELEPHONE (OUTPATIENT)
Dept: NEUROSURGERY | Facility: CLINIC | Age: 45
End: 2025-03-07
Payer: COMMERCIAL

## 2025-03-07 NOTE — TELEPHONE ENCOUNTER
PT CALLED STATES SHE HAD SX W [facility] 3/4/25 AND IS NEEDING A NOTE THAT STATES SHE CANNOT RETURN TO UNTIL 3/18 WHEN SHE HAS HER FOLLOW UP APPT AND CAN RE-EVALUATE - THIS IS FOR W/C     PT PN: [phone]   BEST TIME TO CALL: ANYTIME

## 2025-03-18 ENCOUNTER — OFFICE VISIT (OUTPATIENT)
Dept: NEUROSURGERY | Facility: CLINIC | Age: 45
End: 2025-03-18
Payer: COMMERCIAL

## 2025-03-18 VITALS
RESPIRATION RATE: 18 BRPM | WEIGHT: 238 LBS | HEART RATE: 100 BPM | BODY MASS INDEX: 38.25 KG/M2 | HEIGHT: 66 IN | OXYGEN SATURATION: 98 % | SYSTOLIC BLOOD PRESSURE: 163 MMHG | DIASTOLIC BLOOD PRESSURE: 117 MMHG

## 2025-03-18 DIAGNOSIS — Z98.1 STATUS POST CERVICAL SPINAL FUSION: Primary | ICD-10-CM

## 2025-03-18 PROCEDURE — 99024 POSTOP FOLLOW-UP VISIT: CPT

## 2025-03-18 RX ORDER — CYCLOBENZAPRINE HCL 10 MG
10 TABLET ORAL 3 TIMES DAILY PRN
Qty: 30 TABLET | Refills: 0 | Status: SHIPPED | OUTPATIENT
Start: 2025-03-18

## 2025-03-19 ENCOUNTER — TREATMENT (OUTPATIENT)
Dept: PHYSICAL THERAPY | Facility: CLINIC | Age: 45
End: 2025-03-19
Payer: COMMERCIAL

## 2025-03-19 ENCOUNTER — TELEPHONE (OUTPATIENT)
Dept: NEUROSURGERY | Facility: CLINIC | Age: 45
End: 2025-03-19
Payer: COMMERCIAL

## 2025-03-19 DIAGNOSIS — Z47.89 ORTHOPEDIC AFTERCARE: ICD-10-CM

## 2025-03-19 DIAGNOSIS — M54.2 PAIN, NECK: Primary | ICD-10-CM

## 2025-03-19 NOTE — TELEPHONE ENCOUNTER
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  HUB OKAY TO RELAY MESSAGE

## 2025-03-19 NOTE — PROGRESS NOTES
Physical Therapy Initial Evaluation and Plan of Care  [facility] Therapy [place]  [address], [address]  [place] [address]  P: [phone] F: [phone]    Patient: Smitha Bruno   : 1980  Diagnosis/ICD-10 Code:  Pain, neck [M54.2]  Referring practitioner: Cait Bo PA-C  Date of Initial Visit: 3/19/2025  Today's Date: 3/19/2025  Patient seen for 1 sessions         Visit Diagnoses:    ICD-10-CM ICD-9-CM   1. Pain, neck  M54.2 723.1   2. Orthopedic aftercare  Z47.89 V54.9         Subjective Questionnaire: NDI:2250 = 44% disability      Subjective Evaluation    History of Present Illness  Mechanism of injury: Pt reports having an artificial disc placed between C6-7 on 3/4/25. States she had a follow up with surgeon yesterday and they were pleased with how things are looking. Has another follow up in may.     Reports having h/o shoulder and UE pain/N/T that has now resolved since having the surgery. No longer needing to take as many meds to manage symptoms as she was prior to surgery.    Restrictions: No lifting >10 lbs, no work above shoulders, No bending and reaching, no repetitive head movements.        Patient Occupation: off work since Oct 15th on medical leave. Figuring out if she is able to come back with restrictions or if she needs to wait. Pain  Current pain ratin  At best pain ratin  At worst pain ratin  Location: Neck  Quality: soreness, stiffness.  Relieving factors: heat, rest and medications  Exacerbated by: doing things outside of her restrictions, turning head too fast.    Patient Goals  Patient goals for therapy: decreased edema, decreased pain, increased motion and increased strength             Objective          Static Posture     Comments  Incision on anterior cervical region. closed, no gapping. Some redness along incision site. States she had been putting a bandage over it when in the community. States her MD notified her this was irritating  it more, so she has stopped. No s/s of infection at this time.    Active Range of Motion   Cervical/Thoracic Spine   Cervical    Flexion: 32 (pulling) degrees   Extension: 41 degrees   Left lateral flexion: 35 (muscular pulling) degrees   Right lateral flexion: 30 (stiff and painful) degrees   Left rotation: 66 degrees   Right rotation: 68 degrees   Left Shoulder   Flexion: 140 degrees   Extension: WFL  Abduction: 152 degrees   External rotation BTH: WFL  Internal rotation BTB: WFL    Right Shoulder   Flexion: 140 degrees   Extension: WFL  Abduction: 152 degrees   External rotation BTH: WFL  Internal rotation BTB: WFL    Left Elbow   Flexion: WFL  Extension: WFL  Forearm supination: WFL  Forearm pronation: WFL    Right Elbow   Flexion: WFL  Extension: WFL  Forearm supination: WFL  Forearm pronation: WFL    Strength/Myotome Testing     Left Shoulder     Planes of Motion   Flexion: 4+   Abduction: 4+   External rotation at 0°: 4   Internal rotation at 0°: 4+     Right Shoulder     Planes of Motion   Flexion: 4+   Abduction: 4+   External rotation at 0°: 5   Internal rotation at 0°: 5     Left Elbow   Flexion: 5  Extension: 5  Forearm supination: 4  Forearm pronation: 4-    Right Elbow   Flexion: 5  Extension: 4-  Forearm supination: 4+  Forearm pronation: 4+    Left Wrist/Hand      (2nd hand position)     Trial 1: 61 lbs    Trial 2: 65 lbs    Trial 3: 68 lbs    Average: 64.67 lbs    Right Wrist/Hand      (2nd hand position)     Trial 1: 80 lbs    Trial 2: 77 lbs    Trial 3: 70 lbs    Average: 75.67 lbs          Assessment & Plan       Assessment  Impairments: abnormal or restricted ROM, activity intolerance, impaired physical strength, lacks appropriate home exercise program and pain with function   Functional limitations: carrying objects, lifting, pulling, uncomfortable because of pain, reaching overhead and unable to perform repetitive tasks   Assessment details: The patient is a 44 y.o. female who presents  to physical therapy today for neck pain s/p disc replacement. Patient presents to therapy with mild-moderate pain levels in the neck, UE strength deficits, and 44% disability based on the NDI score. Patient reports difficulty with turning her head quickly, lifting/carrying things, and reaching away from her body secondary to symptoms. Skilled Physical Therapy services needed to address listed impairments and improve upon ability to perform functional activities.  Prognosis: good    Goals  Plan Goals: ST weeks  1. Patient will be independent and compliant with initial HEP  2. Patient will report at least 50% improvement in neck pain and functional abilities since beginning PT intervention.  3. Pt will be independent with maintaining upright posture without verbal cueing in 2 weeks in order to decrease strain on cervical and thoracic spine.   4. Pt will be able to stand for at least 20 minutes while doing dishes with no greater than 2/10 pain.    LT weeks  1. Patient will be independent with final HEP for self-management of condition by DC.  2. Patient will improve score on NDI to less than 22% by DC.   3. Patient will report at least 80% improvement in symptoms by DC in order to allow return to PLOF.  4.  Patient will improve BUE strength to 4+/5 and without neck pain in order to be able to perform heavy household activities such as cleaning and doing laundry.       Plan  Therapy options: will be seen for skilled therapy services  Planned modality interventions: cryotherapy, TENS, electrical stimulation/Russian stimulation, thermotherapy (hydrocollator packs) and dry needling  Planned therapy interventions: manual therapy, motor coordination training, neuromuscular re-education, postural training, soft tissue mobilization, spinal/joint mobilization, strengthening, stretching, therapeutic activities, joint mobilization, home exercise program, gait training, functional ROM exercises, flexibility, body  mechanics training and balance/weight-bearing training  Frequency: 2x week  Duration in weeks: 12  Treatment plan discussed with: patient        See flowsheets for treatment detail.    History # of Personal Factors and/or Comorbidities: LOW (0)  Examination of Body System(s): # of elements: LOW (1-2)  Clinical Presentation: STABLE   Clinical Decision Making: LOW     Timed:         Manual Therapy:         mins  46326;     Therapeutic Exercise:    10     mins  82692;     Neuromuscular Sayda:    10    mins  90113;    Therapeutic Activity:          mins  77758;     Gait Training:           mins  44013;     Ultrasound:          mins  03603;    Ionto                                   mins   13901  Self Care                            mins   48669      Un-Timed:  Electrical Stimulation:         mins  87919 (MC );  Dry Needling          mins self-pay  Traction          mins 56732  Low Eval     30     Mins  41885  Mod Eval          Mins  66292  High Eval                            Mins  22473  Re-Eval                               mins  44414  Canalith Repos                   mins 99148      Timed Treatment:   20   mins   Total Treatment:     50   mins    PT SIGNATURE: Heena Chew, MARILEE   DATE TREATMENT INITIATED: 3/19/2025  License Number: [id number]      Initial Certification  Certification Period:  3/19/2025 thru 6/16/2025  I certify that the therapy services are furnished while this patient is under my care.  The services outlined above are required by this patient, and will be reviewed every 90 days.       Physician Signature:__________________________________________________    PHYSICIAN: Cait Bo PA-C  NPI: [id number]                                      DATE:    Please sign and return via fax to [phone].. Thank you, [facility].

## 2025-03-19 NOTE — TELEPHONE ENCOUNTER
"  Caller: Smitha Bruno \"Nicole\"    Relationship: Self    Best call back number: [phone]      What is the best time to reach you: ANY    Who are you requesting to speak with (clinical staff, provider,  specific staff member): ELENI    Do you know the name of the person who called: ELENI    What was the call regarding: THE PATIENT STATES THAT SHE IS WORKING ON TRYING TO GET IN TOUCH WITH HER HR- SHE WILL CALL BACK WHEN SHE GETS AN ANSWER ON IF SHE WORK ON DESK DUTY      "

## 2025-03-19 NOTE — TELEPHONE ENCOUNTER
Yamile  called asking for a work note for her restrictions. I let her know the provider is out that as soon as she's in I will get the note and fax it over

## 2025-03-19 NOTE — PATIENT INSTRUCTIONS
Access Code: [email or web address]  URL: [email or web address]  Date: 03/19/2025  Prepared by: Heena Chew    Exercises  - Seated Cervical Sidebending AROM  - 2 x daily - 7 x weekly - 2 sets - 10 reps  - Seated Cervical Rotation AROM  - 2 x daily - 7 x weekly - 2 sets - 10 reps  - Standing Cervical Extension AROM  - 2 x daily - 7 x weekly - 2 sets - 10 reps  - Standing Cervical Flexion AROM  - 2 x daily - 7 x weekly - 2 sets - 10 reps  - Standing Backward Shoulder Rolls  - 2 x daily - 7 x weekly - 2 sets - 10 reps  - Seated Scapular Retraction  - 2 x daily - 7 x weekly - 2 sets - 10 reps - 5 hold  - Standing shoulder flexion wall slides  - 2 x daily - 7 x weekly - 2 sets - 10 reps - 5 hold

## 2025-03-25 ENCOUNTER — TREATMENT (OUTPATIENT)
Dept: PHYSICAL THERAPY | Facility: CLINIC | Age: 45
End: 2025-03-25
Payer: COMMERCIAL

## 2025-03-25 DIAGNOSIS — M54.2 PAIN, NECK: Primary | ICD-10-CM

## 2025-03-25 DIAGNOSIS — Z47.89 ORTHOPEDIC AFTERCARE: ICD-10-CM

## 2025-03-25 PROCEDURE — 97110 THERAPEUTIC EXERCISES: CPT

## 2025-03-25 PROCEDURE — 97112 NEUROMUSCULAR REEDUCATION: CPT

## 2025-03-25 NOTE — PROGRESS NOTES
Physical Therapy Daily Treatment Note    Patient: Smitha Bruno  : 1980  Referring practitioner: ALEC Martin  Today's Date: 3/25/2025    VISIT#: 2      Subjective   Smitha Bruno reports: no pain upon arrival. States she feels good. Reports some shoulder discomfort after last session, likely from not moving them much for awhile then starting the exercises.       Objective     See Exercise, Manual, and Modality Logs for complete treatment.     Patient Education: Continue with HEP.    Assessment/Plan  Progressing well with no c/o increased symptoms. Added exercises to facilitate cervical stability, thoracic mobility, and scapular strengthening.    Progress per Plan of Care and Progress strengthening /stabilization /functional activity           Timed:         Manual Therapy:         mins  09375;     Therapeutic Exercise:    28     mins  09060;     Neuromuscular Sayda:    12    mins  05227;    Therapeutic Activity:          mins  77402;     Gait Training:           mins  30119;     Ultrasound:          mins  53775;    Ionto                                   mins   45316  Self Care                            mins   76334      Un-Timed:  Electrical Stimulation:         mins  57890 ( );  Dry Needling          mins self-pay  Traction          mins 22165  Re-Eval                               mins  92537  Canalith Repos                   mins 40766    Timed Treatment:   40   mins   Total Treatment:     40   mins    Heena Chew, MARILEE  Physical Therapist   License: [id number]

## 2025-03-28 ENCOUNTER — TREATMENT (OUTPATIENT)
Dept: PHYSICAL THERAPY | Facility: CLINIC | Age: 45
End: 2025-03-28
Payer: COMMERCIAL

## 2025-03-28 DIAGNOSIS — Z47.89 ORTHOPEDIC AFTERCARE: ICD-10-CM

## 2025-03-28 DIAGNOSIS — M54.2 PAIN, NECK: Primary | ICD-10-CM

## 2025-03-28 NOTE — PROGRESS NOTES
Physical Therapy Daily Treatment Note    Patient: Smitha Bruno  : 1980  Referring practitioner: ALEC Martin  Today's Date: 3/28/2025    VISIT#: 3      Subjective   Smitha Bruno reports: mild soreness, nothing too bad. States she has been trying to stop herself from lifting >10 lbs. States her  has been reminding her. Minimal to no pain.      Objective     See Exercise, Manual, and Modality Logs for complete treatment.     Patient Education: Continue with HEP.    Assessment/Plan  Palpable muscle tightness to RUT. Improved symptoms after MT application. Pt progressing well with exercises prescribed.    Progress per Plan of Care and Progress strengthening /stabilization /functional activity           Timed:         Manual Therapy:    8     mins  53635;     Therapeutic Exercise:    18     mins  26800;     Neuromuscular Sayda:    14    mins  20220;    Therapeutic Activity:          mins  25424;     Gait Training:           mins  76093;     Ultrasound:          mins  56951;    Ionto                                   mins   02978  Self Care                            mins   74554      Un-Timed:  Electrical Stimulation:         mins  29766 ( );  Dry Needling          mins self-pay  Traction          mins 63009  Re-Eval                               mins  21189  Canalith Repos                   mins 34948    Timed Treatment:   40   mins   Total Treatment:     40   mins    Heena Chew, PT  Physical Therapist   License: [id number]

## 2025-04-01 ENCOUNTER — TREATMENT (OUTPATIENT)
Dept: PHYSICAL THERAPY | Facility: CLINIC | Age: 45
End: 2025-04-01
Payer: COMMERCIAL

## 2025-04-01 DIAGNOSIS — Z47.89 ORTHOPEDIC AFTERCARE: ICD-10-CM

## 2025-04-01 DIAGNOSIS — M54.2 PAIN, NECK: Primary | ICD-10-CM

## 2025-04-01 PROCEDURE — 97140 MANUAL THERAPY 1/> REGIONS: CPT

## 2025-04-01 PROCEDURE — 97112 NEUROMUSCULAR REEDUCATION: CPT

## 2025-04-01 PROCEDURE — 97110 THERAPEUTIC EXERCISES: CPT

## 2025-04-01 RX ORDER — ESCITALOPRAM OXALATE 20 MG/1
20 TABLET ORAL DAILY
Qty: 90 TABLET | Refills: 0 | Status: SHIPPED | OUTPATIENT
Start: 2025-04-01

## 2025-04-01 NOTE — PROGRESS NOTES
Physical Therapy Daily Treatment Note    Patient: Smitha Bruno  : 1980  Referring practitioner: ALEC Martin  Today's Date: 2025    VISIT#: 4      Subjective   Smitha Bruno reports: states she is sore, but denies pain upon arrival.       Objective     See Exercise, Manual, and Modality Logs for complete treatment.     Patient Education: Continue with HEP.    Assessment/Plan  Progressed exercises as indicated. Reports pain relief after MT to RUT region. Challenged with push up plus today. Plan to continue progressing to facilitate improved UE/cervical strength and stability.    Progress per Plan of Care and Progress strengthening /stabilization /functional activity           Timed:         Manual Therapy:    8     mins  65294;     Therapeutic Exercise:    12     mins  86160;     Neuromuscular Sayda:    18    mins  51984;    Therapeutic Activity:          mins  83480;     Gait Training:           mins  17473;     Ultrasound:          mins  54121;    Ionto                                   mins   09012  Self Care                            mins   05521      Un-Timed:  Electrical Stimulation:         mins  88935 ( );  Dry Needling          mins self-pay  Traction          mins 25359  Re-Eval                               mins  18316  Canalith Repos                   mins 77289    Timed Treatment:   38   mins   Total Treatment:     38   mins    Heena Chew, MARILEE  Physical Therapist   License: [id number]

## 2025-04-03 ENCOUNTER — TREATMENT (OUTPATIENT)
Dept: PHYSICAL THERAPY | Facility: CLINIC | Age: 45
End: 2025-04-03
Payer: COMMERCIAL

## 2025-04-03 DIAGNOSIS — Z47.89 ORTHOPEDIC AFTERCARE: ICD-10-CM

## 2025-04-03 DIAGNOSIS — M54.2 PAIN, NECK: Primary | ICD-10-CM

## 2025-04-03 NOTE — PROGRESS NOTES
Physical Therapy Daily Treatment Note    Patient: Smitha Bruno  : 1980  Referring practitioner: ALEC Martin  Today's Date: 4/3/2025    VISIT#: 5      Subjective   Smitha Bruno reports: she is having UT and lats muscle soreness from last session. No neck pain, just some stiffness noted.      Objective     See Exercise, Manual, and Modality Logs for complete treatment.     Patient Education: Continue with HEP. Provided pt education on self scar mobilization at home.    Assessment/Plan  Reports relief of UT tightness after MT performed. Progressed as indicated. Pt tolerating well with no neck pain reported. Muscle fatigue noted. Introduced and educated pt on self scar mobilization. Verbalized understanding of how to perform this at home.    Progress per Plan of Care and Progress strengthening /stabilization /functional activity           Timed:         Manual Therapy:    8     mins  10410;     Therapeutic Exercise:    24     mins  72994;     Neuromuscular Sayda:    17    mins  17801;    Therapeutic Activity:          mins  21956;     Gait Training:           mins  15286;     Ultrasound:          mins  77793;    Ionto                                   mins   38224  Self Care                            mins   61078      Un-Timed:  Electrical Stimulation:         mins  10810 ( );  Dry Needling          mins self-pay  Traction          mins 61041  Re-Eval                               mins  61019  Canalith Repos                   mins 44980    Timed Treatment:   49   mins   Total Treatment:     49   mins    Heena Chew, MARILEE  Physical Therapist   License: [id number]

## 2025-04-08 ENCOUNTER — TREATMENT (OUTPATIENT)
Dept: PHYSICAL THERAPY | Facility: CLINIC | Age: 45
End: 2025-04-08
Payer: COMMERCIAL

## 2025-04-08 DIAGNOSIS — Z47.89 ORTHOPEDIC AFTERCARE: ICD-10-CM

## 2025-04-08 DIAGNOSIS — M54.2 PAIN, NECK: Primary | ICD-10-CM

## 2025-04-08 NOTE — PROGRESS NOTES
Physical Therapy Daily Treatment Note    Patient: Smitha Bruno  : 1980  Referring practitioner: ALEC Martin  Today's Date: 2025    VISIT#: 6      Subjective   Smitha Bruno reports: no neck pain upon arrival.  States she has been trying to make sure she does not do things outside of her restrictions. Doing well with daily activities. Not yet back to work. MD follow up 25.      Objective     See Exercise, Manual, and Modality Logs for complete treatment.     Patient Education: Continue with HEP.    Assessment/Plan  Progressing well with PT intervention. Discussed possibly decreasing frequency to 1x/wk in the next couple weeks.    Progress per Plan of Care and Progress strengthening /stabilization /functional activity           Timed:         Manual Therapy:    10     mins  96422;     Therapeutic Exercise:    24     mins  58330;     Neuromuscular Sayda:    12    mins  74943;    Therapeutic Activity:          mins  96568;     Gait Training:           mins  63310;     Ultrasound:          mins  81176;    Ionto                                   mins   38569  Self Care                            mins   60056      Un-Timed:  Electrical Stimulation:         mins  29264 ( );  Dry Needling          mins self-pay  Traction          mins 27714  Re-Eval                               mins  76227  Canalith Repos                   mins 93726    Timed Treatment:   46   mins   Total Treatment:     46   mins    Heena Chew, MARILEE  Physical Therapist   License: [id number]

## 2025-04-10 ENCOUNTER — TREATMENT (OUTPATIENT)
Dept: PHYSICAL THERAPY | Facility: CLINIC | Age: 45
End: 2025-04-10
Payer: COMMERCIAL

## 2025-04-10 DIAGNOSIS — Z47.89 ORTHOPEDIC AFTERCARE: ICD-10-CM

## 2025-04-10 DIAGNOSIS — M54.2 PAIN, NECK: Primary | ICD-10-CM

## 2025-04-10 NOTE — PROGRESS NOTES
Physical Therapy Daily Treatment Note    Patient: Smitha Bruno  : 1980  Referring practitioner: ALEC Martin  Today's Date: 4/10/2025    VISIT#: 7      Subjective   Smitha Bruno reports: her neck is doing well. No pain reported upon arrival.      Objective     See Exercise, Manual, and Modality Logs for complete treatment.     Patient Education: Continue with HEP.    Assessment/Plan  Discussed decreasing to 1x/week due to pt progressing very well with no neck pain reported. Plan to continue progressing as tolerated for improved cervical stability and global UE strengthening.    Progress per Plan of Care and Progress strengthening /stabilization /functional activity           Timed:         Manual Therapy:         mins  61657;     Therapeutic Exercise:    20     mins  15855;     Neuromuscular Sayda:    10    mins  48734;    Therapeutic Activity:          mins  60382;     Gait Training:           mins  09411;     Ultrasound:          mins  48168;    Ionto                                   mins   96591  Self Care                            mins   02178      Un-Timed:  Electrical Stimulation:         mins  95177 (MC );  Dry Needling          mins self-pay  Traction          mins 33641  Re-Eval                               mins  07853  Canalith Repos                   mins 66755    Timed Treatment:   30   mins   Total Treatment:     30   mins    Heena Chew, PT  Physical Therapist   License: [id number]

## 2025-04-16 ENCOUNTER — TREATMENT (OUTPATIENT)
Dept: PHYSICAL THERAPY | Facility: CLINIC | Age: 45
End: 2025-04-16
Payer: COMMERCIAL

## 2025-04-16 DIAGNOSIS — Z47.89 ORTHOPEDIC AFTERCARE: ICD-10-CM

## 2025-04-16 DIAGNOSIS — M54.2 PAIN, NECK: Primary | ICD-10-CM

## 2025-04-16 NOTE — PROGRESS NOTES
Physical Therapy Daily Treatment Note    Patient: Smitha Bruno  : 1980  Referring practitioner: ALEC Martin  Today's Date: 2025    VISIT#: 8      Subjective   Smitha Bruno reports: No neck pain upon arrival.       Objective     See Exercise, Manual, and Modality Logs for complete treatment.     Patient Education: Continue with HEP.    Assessment/Plan  Progressing well with PT intervention. Added exercises with WB through [facility] and pt had no difficulty with this. Plan to re-evaluate next session.  Progress per Plan of Care and Progress strengthening /stabilization /functional activity           Timed:         Manual Therapy:         mins  40089;     Therapeutic Exercise:    20     mins  99426;     Neuromuscular Sayda:    10    mins  68800;    Therapeutic Activity:     10     mins  55527;     Gait Training:           mins  65168;     Ultrasound:          mins  31309;    Ionto                                   mins   52374  Self Care                            mins   80451      Un-Timed:  Electrical Stimulation:         mins  49280 ( );  Dry Needling          mins self-pay  Traction          mins 29676  Re-Eval                               mins  96422  Canalith Repos                   mins 48821    Timed Treatment:   40   mins   Total Treatment:     40   mins    Heena Chew, MARILEE  Physical Therapist   License: [id number]

## 2025-04-22 ENCOUNTER — TREATMENT (OUTPATIENT)
Dept: PHYSICAL THERAPY | Facility: CLINIC | Age: 45
End: 2025-04-22
Payer: COMMERCIAL

## 2025-04-22 DIAGNOSIS — M54.2 PAIN, NECK: Primary | ICD-10-CM

## 2025-04-22 DIAGNOSIS — Z47.89 ORTHOPEDIC AFTERCARE: ICD-10-CM

## 2025-04-22 PROCEDURE — 97110 THERAPEUTIC EXERCISES: CPT | Performed by: PHYSICAL THERAPIST

## 2025-04-22 PROCEDURE — 97530 THERAPEUTIC ACTIVITIES: CPT | Performed by: PHYSICAL THERAPIST

## 2025-04-22 PROCEDURE — 97112 NEUROMUSCULAR REEDUCATION: CPT | Performed by: PHYSICAL THERAPIST

## 2025-04-22 NOTE — PROGRESS NOTES
Physical Therapy Daily Treatment Note  + Progress Note    [address] [address] [address]  [place] [address]    Patient:  Smitha Bruno  :  1980  Referring practitioner:  ALEC Martin  Date of Initial Visit:  Type: THERAPY  Noted: 3/19/2025  Today's Date:  2025      Visit Diagnoses:    ICD-10-CM ICD-9-CM   1. Pain, neck  M54.2 723.1   2. Orthopedic aftercare  Z47.89 V54.9       VISIT#:  9 in POC.      Subjective Questionnaire:  NDI:   = 44% disability (eval); today = 6% disability.      Subjective   Smitha Bruno reports no complications after last visit.  Continues to complete HEP daily as directed without problems.  She is pleased with her progress with therapy thus far and it is helping to reduce her symptoms.  She would like to continue PT to further improve her functional status.    Her surgery significantly helped reduce her symptoms.  Traction PT before surgery also helped.  She is getting close to back to baseline.  No complaints of neck pain today.    She has not yet returned to work as she still has lifting and other other restrictions; she returns to surgeon on May 12 and is hoping to have restrictions lifted and be able resume working as a nurse.      Objective   See exercise, manual, and modality logs for complete treatment.       Active Range of Motion   Seated    Cervical/Thoracic Spine   Cervical     Flexion: 32 (pulling) degrees (eval); today = 39  Extension: 41 degrees (eval); today = 45  Left lateral flexion: 35 (muscular pulling) degrees (eval); today = 41  Right lateral flexion: 30 (stiff and painful) degrees (eval); today = 39  Left rotation: 66 degrees (eval); today = 65  Right rotation: 68 degrees (eval); today = 73      Left Shoulder   Flexion: 140 degrees (eval); today = 140  Abduction: 152 degrees (eval); today = 150    Right Shoulder   Flexion: 140 degrees (eval); today = 158  Abduction: 152 degrees (eval); today = 152      Strength/Myotome Testing    Seated     Left Shoulder      Planes of Motion   Flexion: 4+ (eval); today = 4+  Abduction: 4+ (eval); today = 4+  External rotation at 0°: 4 (eval); today = 4+  Internal rotation at 0°: 4+ (eval); today = 5     Right Shoulder      Planes of Motion   Flexion: 4+ (eval); today = 5  Abduction: 4+ (eval); today = 5    Left Elbow   Extension: 5 (eval); today = 4+  Forearm supination: 4 (eval); today = 5  Forearm pronation: 4- (eval); today = 5     Right Elbow   Extension: 4- (eval); today = 5  Forearm supination: 4+ (eval); today = 5  Forearm pronation: 4+ (eval); today = 5    Left Wrist/Hand       (2nd hand position)   64.67 lbs (eval); today = 63     Right Wrist/Hand       (2nd hand position)   75.67 lbs (eval); today = 81      Goals  Plan Goals: ST weeks  1. Patient will be independent and compliant with initial HEP.  --MET  2. Patient will report at least 50% improvement in neck pain and functional abilities since beginning PT intervention.  --MET  3. Pt will be independent with maintaining upright posture without verbal cueing in 2 weeks in order to decrease strain on cervical and thoracic spine.  --MET  4. Pt will be able to stand for at least 20 minutes while doing dishes with no greater than 2/10 pain.  --MET     LT weeks (NOT ASSESSED TODAY)  1. Patient will be independent with final HEP for self-management of condition by DC.  2. Patient will improve score on NDI to less than 22% by DC.   3. Patient will report at least 80% improvement in symptoms by DC in order to allow return to PLOF.  4.  Patient will improve BUE strength to 4+/5 and without neck pain in order to be able to perform heavy household activities such as cleaning and doing laundry.       ASSESSMENT  Pt is making steady progress towards completion of goals.  She met all STGs today.  Sig reduction in NDI disability score today vs eval.  Many objective improvements in ROM/MMT values today vs initial eval, though some values did  not change since initial assessment.  No pain with any exercise today, only fatigue.  Cues as noted.  Held selected exercises per time.  No complications today.      PLAN  Current POC remains appropriate for pt at this time.  Continue skilled PT services and progress as tolerated per PT evaluation.          Timed:  Therapeutic Exercise:    14     mins  28491;     Neuromuscular Sayda:    12    mins  86529;    Therapeutic Activity:     17     mins  81398;        Timed Treatment:   43   mins   Total Treatment:     436   mins      Ilan Colbert, PT  [place] License # [id number]  Physical Therapist

## 2025-04-28 ENCOUNTER — TELEPHONE (OUTPATIENT)
Dept: NEUROSURGERY | Facility: CLINIC | Age: 45
End: 2025-04-28
Payer: COMMERCIAL

## 2025-04-28 NOTE — TELEPHONE ENCOUNTER
Caller: DANA    Relationship: [facility]      Best call back number: [phone]    What is the medical concern/diagnosis: C-SPINE FUSION    What specialty or service is being requested: PT    What is the provider, practice or medical service name: [facility]      What is the office phone number: [phone]    Any additional details: THE OFFICE NEEDS A NEW REFERRAL D/T IT IS WC RELATED- THE REFERRAL NEEDS TO INCLUDE FREQUENCY & DURATION

## 2025-04-29 ENCOUNTER — TREATMENT (OUTPATIENT)
Dept: PHYSICAL THERAPY | Facility: CLINIC | Age: 45
End: 2025-04-29
Payer: COMMERCIAL

## 2025-04-29 NOTE — PROGRESS NOTES
Left without being seen. Pt arrived for apt. Did not have active referral. Rescheduled for tomorrow.

## 2025-04-29 NOTE — PROGRESS NOTES
Physical Therapy Daily Treatment Note    Patient: Smitha Bruno  : 1980  Referring practitioner: ALEC Martin  Today's Date: 2025    VISIT#: Visit count could not be calculated. Make sure you are using a visit which is associated with an episode.      Subjective   Smitha Bruno reports: ***      Objective     See Exercise, Manual, and Modality Logs for complete treatment.     Patient Education: Continue with HEP.    Assessment/Plan      {AMB PT PLAN (SOAP Note):96393}           Timed:         Manual Therapy:    ***     mins  83012;     Therapeutic Exercise:    ***     mins  26013;     Neuromuscular Sayda:    ***    mins  44239;    Therapeutic Activity:     ***     mins  82729;     Gait Training:      ***     mins  62025;     Ultrasound:     ***     mins  62302;    Ionto                               ***    mins   73383  Self Care                       ***     mins   77384      Un-Timed:  Electrical Stimulation:    ***     mins  81872 ( );  Dry Needling     ***     mins self-pay  Traction     ***     mins 37730  Re-Eval                           ***    mins  46587  Canalith Repos              ***     mins 80307    Timed Treatment:   ***   mins   Total Treatment:     ***   mins    Heena Chew, PT , DPT  Physical Therapist   License: [id number]

## 2025-04-30 ENCOUNTER — TREATMENT (OUTPATIENT)
Dept: PHYSICAL THERAPY | Facility: CLINIC | Age: 45
End: 2025-04-30
Payer: COMMERCIAL

## 2025-04-30 DIAGNOSIS — M54.2 PAIN, NECK: Primary | ICD-10-CM

## 2025-04-30 DIAGNOSIS — Z47.89 ORTHOPEDIC AFTERCARE: ICD-10-CM

## 2025-04-30 NOTE — PROGRESS NOTES
Physical Therapy Daily Treatment Note    Patient: Smitha Bruno  : 1980  Referring practitioner: No ref. provider found  Today's Date: 2025    VISIT#: 10      Subjective   Smitha Bruno reports: everything is going well. No increased pain. States she has been able to perform household chores. Notes she has not been lifting bags of concrete or anything. States she has an MD apt on .      Objective     See Exercise, Manual, and Modality Logs for complete treatment.     Patient Education: Continue with HEP.    Assessment/Plan  Tolerating exercises well with no exacerbation of symptoms.   Progress per Plan of Care and Progress strengthening /stabilization /functional activity           Timed:         Manual Therapy:         mins  73070;     Therapeutic Exercise:    12     mins  97763;     Neuromuscular Sayda:    10    mins  84027;    Therapeutic Activity:     8     mins  54852;     Gait Training:           mins  96981;     Ultrasound:          mins  47621;    Ionto                                   mins   27668  Self Care                            mins   27791      Un-Timed:  Electrical Stimulation:         mins  53373 ( );  Dry Needling          mins self-pay  Traction          mins 01672  Re-Eval                               mins  12341  Canalith Repos                   mins 66366    Timed Treatment:   30   mins   Total Treatment:     30   mins    Heena Chew, PT , DPT  Physical Therapist   License: [id number]

## 2025-05-06 ENCOUNTER — TELEPHONE (OUTPATIENT)
Dept: NEUROSURGERY | Facility: CLINIC | Age: 45
End: 2025-05-06

## 2025-05-06 NOTE — TELEPHONE ENCOUNTER
LUCY FROM [employer] CALLED ASKING IF PHYSICAL THERAPY IS STILL NECESSARY? HE DID NOT RECEIVE ANY REQUESTS BY FAX OR EMAIL, NOTHING ON FILE ON HIS END. IF NECESSARY, NEED SOMETHING IN WRITING.    LUCY CONTACT: [phone]  BEST TIME TO CONTACT: 9AM-5PM CST.

## 2025-05-08 ENCOUNTER — HOSPITAL ENCOUNTER (OUTPATIENT)
Dept: CT IMAGING | Facility: HOSPITAL | Age: 45
Discharge: HOME | End: 2025-05-08
Payer: COMMERCIAL

## 2025-05-08 DIAGNOSIS — Z98.1 STATUS POST CERVICAL SPINAL FUSION: ICD-10-CM

## 2025-05-08 PROCEDURE — 72125 CT NECK SPINE W/O DYE: CPT

## 2025-05-09 NOTE — PROGRESS NOTES
"Subjective   History of Present Illness: Smitha Bruno is a 45 y.o. female is here today for surgical follow-up. Today patient reports essentially resolution of her left upper extremity symptoms      Chief Complaint   Patient presents with    Post-op          Previous treatment:   Muscle Relaxants, Greater than 6 weeks of physical therapy, Home exercise program, Narcotic's, Rest, and Post-op PT    Previous neurosurgery:  3/4/2025 C6-7 discectomy and placement of artifical disc    Previous injections:     The following portions of the patient's history were reviewed and updated as appropriate: allergies, current medications, past family history, past medical history, past social history, past surgical history, and problem list.    Review of Systems   Constitutional:  Positive for activity change.   HENT: Negative.     Eyes: Negative.    Respiratory: Negative.     Cardiovascular: Negative.    Endocrine: Negative.    Genitourinary: Negative.    Musculoskeletal: Negative.    Skin: Negative.    Allergic/Immunologic: Negative.    Neurological: Negative.    Hematological: Negative.    Psychiatric/Behavioral: Negative.         Objective      /97 (BP Location: Left arm, Patient Position: Sitting)   Pulse 78   Ht 167.6 cm (66\")   Wt 110 kg (242 lb)   SpO2 97%   BMI 39.06 kg/m²    Body mass index is 39.06 kg/m².  Vitals:    05/12/25 1338   PainSc: 0-No pain         Neurological Exam        Assessment & Plan   Independent Review of Radiographic Studies:      I personally reviewed and interpreted the images from the following studies.    CT cervical spine: Hardware intact and in good position    Medical Decision Making:      Smitha Bruno is a 45 y.o. female status post C6-7 disc arthroplasty with near resolution of her preoperative symptoms.  Imaging is reassuring.  Patient has no limitations from my perspective and can return to work full duty.  I will see her back as needed in the future.      Diagnoses and all " orders for this visit:    1. Cervical radiculopathy (Primary)      No follow-ups on file.    This patient was examined wearing appropriate personal protective equipment.                      Dr. Mo Correa IV    05/12/25  14:06 EDT

## 2025-05-12 ENCOUNTER — OFFICE VISIT (OUTPATIENT)
Dept: NEUROSURGERY | Facility: CLINIC | Age: 45
End: 2025-05-12

## 2025-05-12 VITALS
DIASTOLIC BLOOD PRESSURE: 97 MMHG | WEIGHT: 242 LBS | BODY MASS INDEX: 38.89 KG/M2 | HEART RATE: 78 BPM | OXYGEN SATURATION: 97 % | SYSTOLIC BLOOD PRESSURE: 159 MMHG | HEIGHT: 66 IN

## 2025-05-12 DIAGNOSIS — M54.12 CERVICAL RADICULOPATHY: Primary | ICD-10-CM

## 2025-05-12 DIAGNOSIS — M50.20 CERVICAL DISC HERNIATION: ICD-10-CM

## 2025-05-12 PROCEDURE — 99024 POSTOP FOLLOW-UP VISIT: CPT | Performed by: NEUROLOGICAL SURGERY

## 2025-05-14 DIAGNOSIS — E23.7 LESION OF PITUITARY GLAND: Primary | ICD-10-CM

## 2025-06-09 ENCOUNTER — HOSPITAL ENCOUNTER (OUTPATIENT)
Dept: MRI IMAGING | Facility: HOSPITAL | Age: 45
Discharge: HOME | End: 2025-06-09
Payer: COMMERCIAL

## 2025-06-09 DIAGNOSIS — E23.7 LESION OF PITUITARY GLAND: ICD-10-CM

## 2025-06-09 PROCEDURE — A9579 GAD-BASE MR CONTRAST NOS,1ML: HCPCS

## 2025-06-09 PROCEDURE — 25010000002 GADOTERIDOL PER 1 ML

## 2025-06-09 PROCEDURE — 70553 MRI BRAIN STEM W/O & W/DYE: CPT

## 2025-06-09 RX ADMIN — GADOTERIDOL 20 ML: 279.3 INJECTION, SOLUTION INTRAVENOUS at 17:18

## 2025-06-10 ENCOUNTER — TELEPHONE (OUTPATIENT)
Dept: NEUROSURGERY | Facility: CLINIC | Age: 45
End: 2025-06-10
Payer: COMMERCIAL

## 2025-06-10 ENCOUNTER — DOCUMENTATION (OUTPATIENT)
Dept: PHYSICAL THERAPY | Facility: CLINIC | Age: 45
End: 2025-06-10
Payer: COMMERCIAL

## 2025-06-10 NOTE — PROGRESS NOTES
Discharge Summary  Discharge Summary from Physical Therapy Report      Dates  PT visit: 3/19/25-4/30/25  Number of Visits: 10    Discharge Status of Patient: See MD Note dated 5/12/25    Goals: Partially Met    Discharge Plan: Continue with current home exercise program as instructed    Comments Pt progressing well with PT intervention. Difficulty getting auth then patient returned to work.DC at this time.    Date of Discharge 6/10/25        Heena Chew, PT, DPT  Physical Therapist

## 2025-06-13 NOTE — TELEPHONE ENCOUNTER
"Called patient and told her the following response from the Provider:  \" Please call patient and let her know that her brain MRI shows no abnormalities at this time\"   She verbally understood.    "

## 2025-07-05 RX ORDER — METOPROLOL SUCCINATE 50 MG/1
100 TABLET, EXTENDED RELEASE ORAL DAILY
Qty: 180 TABLET | Refills: 0 | Status: SHIPPED | OUTPATIENT
Start: 2025-07-05

## (undated) DEVICE — THE STERILE CAMERA HANDLE COVER IS FOR USE WITH THE STERIS SURGICAL LIGHTING AND VISUALIZATION SYSTEMS.

## (undated) DEVICE — TUBING, SUCTION, 1/4" X 12', STRAIGHT: Brand: MEDLINE

## (undated) DEVICE — ELECTRD BLD EZ CLN MOD 4IN

## (undated) DEVICE — PK BASIC SPINE 50

## (undated) DEVICE — SHEET, DRAPE, SPLIT, STERILE: Brand: MEDLINE

## (undated) DEVICE — ANTIBACTERIAL UNDYED BRAIDED (POLYGLACTIN 910), SYNTHETIC ABSORBABLE SUTURE: Brand: COATED VICRYL

## (undated) DEVICE — DRP OPMI 326071

## (undated) DEVICE — MICRO HVTSA, 0.5G AND HVTSA SOURCEMARK PRODUCT CODE M1206 AND M1206-01: Brand: EXOFIN MICRO HVTSA, 0.5G

## (undated) DEVICE — HEAD SUPPORT 1358225 BIOFLEK SURGICAL: Brand: BIO-FLEK

## (undated) DEVICE — SOL IRR NACL 0.9PCT BO 1000ML

## (undated) DEVICE — SOLUTION,WATER,IRRIGATION,1000ML,STERILE: Brand: MEDLINE

## (undated) DEVICE — 3.0MM PRECISION NEURO (MATCH HEAD)

## (undated) DEVICE — SPONGE,NEURO,1"X1",XR,STRL,LF,10/PK: Brand: MEDLINE

## (undated) DEVICE — THE STERILE LIGHT HANDLE COVER IS USED WITH STERIS SURGICAL LIGHTING AND VISUALIZATION SYSTEMS.

## (undated) DEVICE — GAUZE,SPONGE,4"X4",16PLY,XRAY,STRL,LF: Brand: MEDLINE

## (undated) DEVICE — DECANTER: Brand: UNBRANDED

## (undated) DEVICE — SEALANT WND FIBRIN TISSEEL PREFIL/SYR/PRIMAFZ 4ML

## (undated) DEVICE — GLV SURG BIOGEL LTX PF 8

## (undated) DEVICE — DRP C/ARMOR

## (undated) DEVICE — SMOKE EVACUATION TUBING WITH 7/8 IN TO 1/4 IN REDUCER: Brand: BUFFALO FILTER

## (undated) DEVICE — RESTRNT LIMB FOAM 2STRAP

## (undated) DEVICE — DRSNG WND BORDR/ADHS NONADHR/GZ LF 4X4IN STRL

## (undated) DEVICE — UNDERGLV SURG BIOGEL/PI PF SYNTH SURG SZ8.5 BLU 50/BX

## (undated) DEVICE — KT SURG TURNOVER 050

## (undated) DEVICE — DISTRACT SCRW 12MM STRL